# Patient Record
Sex: MALE | Race: WHITE | NOT HISPANIC OR LATINO
[De-identification: names, ages, dates, MRNs, and addresses within clinical notes are randomized per-mention and may not be internally consistent; named-entity substitution may affect disease eponyms.]

---

## 2018-10-25 ENCOUNTER — APPOINTMENT (OUTPATIENT)
Dept: GASTROENTEROLOGY | Facility: CLINIC | Age: 63
End: 2018-10-25
Payer: COMMERCIAL

## 2018-10-25 VITALS
TEMPERATURE: 98.2 F | HEIGHT: 67 IN | BODY MASS INDEX: 28.72 KG/M2 | HEART RATE: 82 BPM | DIASTOLIC BLOOD PRESSURE: 60 MMHG | OXYGEN SATURATION: 96 % | SYSTOLIC BLOOD PRESSURE: 110 MMHG | WEIGHT: 183 LBS

## 2018-10-25 DIAGNOSIS — Z12.11 ENCOUNTER FOR SCREENING FOR MALIGNANT NEOPLASM OF COLON: ICD-10-CM

## 2018-10-25 DIAGNOSIS — Z86.79 PERSONAL HISTORY OF OTHER DISEASES OF THE CIRCULATORY SYSTEM: ICD-10-CM

## 2018-10-25 DIAGNOSIS — Z87.19 PERSONAL HISTORY OF OTHER DISEASES OF THE DIGESTIVE SYSTEM: ICD-10-CM

## 2018-10-25 PROCEDURE — 99203 OFFICE O/P NEW LOW 30 MIN: CPT

## 2018-10-25 RX ORDER — LOSARTAN POTASSIUM AND HYDROCHLOROTHIAZIDE 25; 100 MG/1; MG/1
100-25 TABLET ORAL
Refills: 0 | Status: ACTIVE | COMMUNITY

## 2018-10-25 RX ORDER — OMEPRAZOLE MAGNESIUM 20 MG/1
20 CAPSULE, DELAYED RELEASE ORAL
Refills: 0 | Status: ACTIVE | COMMUNITY

## 2018-10-25 RX ORDER — TAMSULOSIN HYDROCHLORIDE 0.4 MG/1
0.4 CAPSULE ORAL
Refills: 0 | Status: ACTIVE | COMMUNITY

## 2018-12-03 ENCOUNTER — CHART COPY (OUTPATIENT)
Age: 63
End: 2018-12-03

## 2018-12-12 ENCOUNTER — APPOINTMENT (OUTPATIENT)
Dept: GASTROENTEROLOGY | Facility: AMBULATORY MEDICAL SERVICES | Age: 63
End: 2018-12-12
Payer: COMMERCIAL

## 2018-12-12 PROCEDURE — 45385 COLONOSCOPY W/LESION REMOVAL: CPT

## 2020-07-05 ENCOUNTER — TRANSCRIPTION ENCOUNTER (OUTPATIENT)
Age: 65
End: 2020-07-05

## 2020-08-24 ENCOUNTER — APPOINTMENT (OUTPATIENT)
Dept: GASTROENTEROLOGY | Facility: CLINIC | Age: 65
End: 2020-08-24
Payer: COMMERCIAL

## 2020-08-24 VITALS
WEIGHT: 175 LBS | SYSTOLIC BLOOD PRESSURE: 110 MMHG | HEIGHT: 67 IN | TEMPERATURE: 98 F | BODY MASS INDEX: 27.47 KG/M2 | DIASTOLIC BLOOD PRESSURE: 78 MMHG

## 2020-08-24 DIAGNOSIS — D72.820 LYMPHOCYTOSIS (SYMPTOMATIC): ICD-10-CM

## 2020-08-24 DIAGNOSIS — R10.32 LEFT LOWER QUADRANT PAIN: ICD-10-CM

## 2020-08-24 PROCEDURE — 99214 OFFICE O/P EST MOD 30 MIN: CPT

## 2020-08-24 RX ORDER — PSYLLIUM HUSK 6 G/6G
100 GRANULE ORAL
Refills: 0 | Status: ACTIVE | COMMUNITY

## 2020-08-24 RX ORDER — AMOXICILLIN AND CLAVULANATE POTASSIUM 875; 125 MG/1; MG/1
875-125 TABLET, COATED ORAL
Qty: 28 | Refills: 0 | Status: ACTIVE | COMMUNITY
Start: 2020-08-24 | End: 1900-01-01

## 2020-08-24 RX ORDER — SODIUM SULFATE, POTASSIUM SULFATE, MAGNESIUM SULFATE 17.5; 3.13; 1.6 G/ML; G/ML; G/ML
17.5-3.13-1.6 SOLUTION, CONCENTRATE ORAL
Qty: 1 | Refills: 0 | Status: DISCONTINUED | COMMUNITY
Start: 2018-12-03 | End: 2020-08-24

## 2020-08-25 NOTE — HISTORY OF PRESENT ILLNESS
[FreeTextEntry1] : The patient was seen in coverage for Dr. Wang.\par \par Patient has a history of adenomatous colonic polyps and that has known left colon diverticulosis.  He went to the [facility] emergency room on July 5 with abdominal pain for 1 week and bloody diarrhea.  CT scan revealed acute moderate sigmoid diverticulitis.  The patient was treated with Cipro and metronidazole for 10 days with resolution of his symptoms.  He felt well until about 5 days ago when he again developed intermittent lower abdominal pain shooting to the right side.  He denies fever.  He had a small bowel movement on Saturday and Sunday with a single drop of blood on the toilet paper.  He denies nausea, vomiting, heartburn, dysphasia.  The patient has lost 10 pounds since July 5.

## 2020-08-25 NOTE — PHYSICAL EXAM
[General Appearance - Alert] : alert [General Appearance - In No Acute Distress] : in no acute distress [Neck Appearance] : the appearance of the neck was normal [Neck Cervical Mass (___cm)] : no neck mass was observed [Jugular Venous Distention Increased] : there was no jugular-venous distention [Thyroid Diffuse Enlargement] : the thyroid was not enlarged [Thyroid Nodule] : there were no palpable thyroid nodules [Heart Sounds] : normal S1 and S2 [Auscultation Breath Sounds / Voice Sounds] : lungs were clear to auscultation bilaterally [Heart Rate And Rhythm] : heart rate was normal and rhythm regular [Heart Sounds Gallop] : no gallops [Heart Sounds Pericardial Friction Rub] : no pericardial rub [Murmurs] : no murmurs [Edema] : there was no peripheral edema [Abdomen Soft] : soft [Bowel Sounds] : normal bowel sounds [Abdomen Mass (___ Cm)] : no abdominal mass palpated [] : no hepato-splenomegaly [No CVA Tenderness] : no ~M costovertebral angle tenderness [No Spinal Tenderness] : no spinal tenderness [Affect] : the affect was normal [Oriented To Time, Place, And Person] : oriented to person, place, and time [Impaired Insight] : insight and judgment were intact [FreeTextEntry1] : LLQ tenderness without rebound

## 2020-08-25 NOTE — REASON FOR VISIT
[Acute] : an acute visit [FreeTextEntry1] : Diverticulitis, left lower quadrant pain, history of adenomatous colonic polyps

## 2020-08-25 NOTE — CONSULT LETTER
[FreeTextEntry1] : Dear Dr. Roshan Sandoval,\[facility] \[facility] I had the pleasure of seeing your patient SAMI MILES in the office today.  My office note is attached. PLEASE READ THE "ASSESSMENT" SECTION OF THE NOTE TO SEE MY IMPRESSION AND PLAN.\par \[facility] Thank you very much for allowing me to participate in the care of your patient.\[facility] \[facility] Sincerely,\[facility] \[facility] Lex Radford M.D., FAC, [facility] Director, Celiac Program at [facility]  of Medicine\[facility]ad [employer] [facility] [employer] [facility] at [facility] Practice Director,[facility] - Gastroenterology/Internal Medicine at [place]\[facility] [address]\[place], [place] [address] Tel: [phone]\[facility] Email: [email or web address] \[facility] \[facility] The attached note has been created using a voice recognition system (Dragon).  There may be some misspellings and typos.  Please call my office if you have any issues or questions.

## 2020-08-25 NOTE — ASSESSMENT
[FreeTextEntry1] : Patient status post an episode of diverticulitis in early July who now has recurrent abdominal pain with left lower quadrant tenderness consistent with recurrent diverticulitis.  He has a history of adenomatous polyps.\par \par Patient was given Augmentin 875 mg twice daily for 14 days.  He was also advised to stay on a low residue diet.\par \par Patient was advised to call if he has any worsening of his symptoms.  Otherwise he will return to see us in 3 weeks.\par \par The patient was advised that he will require a colonoscopy in about 6 weeks given the 2 episodes of diverticulitis in less than 2 months.

## 2020-08-27 ENCOUNTER — APPOINTMENT (OUTPATIENT)
Dept: GASTROENTEROLOGY | Facility: CLINIC | Age: 65
End: 2020-08-27

## 2020-09-11 ENCOUNTER — APPOINTMENT (OUTPATIENT)
Dept: GASTROENTEROLOGY | Facility: CLINIC | Age: 65
End: 2020-09-11
Payer: COMMERCIAL

## 2020-09-11 VITALS
HEART RATE: 95 BPM | BODY MASS INDEX: 27.31 KG/M2 | TEMPERATURE: 97.3 F | WEIGHT: 174 LBS | DIASTOLIC BLOOD PRESSURE: 74 MMHG | SYSTOLIC BLOOD PRESSURE: 106 MMHG | OXYGEN SATURATION: 98 % | HEIGHT: 67 IN

## 2020-09-11 DIAGNOSIS — Z11.59 ENCOUNTER FOR SCREENING FOR OTHER VIRAL DISEASES: ICD-10-CM

## 2020-09-11 DIAGNOSIS — Z01.818 ENCOUNTER FOR OTHER PREPROCEDURAL EXAMINATION: ICD-10-CM

## 2020-09-11 PROCEDURE — 99213 OFFICE O/P EST LOW 20 MIN: CPT

## 2020-09-11 RX ORDER — SODIUM SULFATE, POTASSIUM SULFATE, MAGNESIUM SULFATE 17.5; 3.13; 1.6 G/ML; G/ML; G/ML
17.5-3.13-1.6 SOLUTION, CONCENTRATE ORAL
Qty: 1 | Refills: 0 | Status: ACTIVE | COMMUNITY
Start: 2020-09-11 | End: 1900-01-01

## 2020-09-12 NOTE — CONSULT LETTER
[FreeTextEntry1] : Dear Dr. Roshan Sandoval,\[facility] \[facility] I had the pleasure of seeing your patient SAMI MILES in the office today.  My office note is attached. PLEASE READ THE "ASSESSMENT" SECTION OF THE NOTE TO SEE MY IMPRESSION AND PLAN.\par \[facility] Thank you very much for allowing me to participate in the care of your patient.\[facility] \[facility] Sincerely,\[facility] \[facility] Lex Radford M.D., FAC, [facility] Director, Celiac Program at [facility]  of Medicine\[facility] brad [facility] at [facility] Practice Director,[facility] - Gastroenterology/Internal Medicine at [place]\[facility] [address]\[place], [place] [address] Tel: [phone]\[facility] Email: [email or web address] \[facility] \[facility] The attached note has been created using a voice recognition system (Dragon).  There may be some misspellings and typos.  Please call my office if you have any issues or questions.

## 2020-09-12 NOTE — HISTORY OF PRESENT ILLNESS
[FreeTextEntry1] : The patient's completed 14 days of Augmentin 875 mg twice daily for diverticulitis on Sunday.  The pain has resolved.  The patient had cramping and loose stools while on the antibiotic but the symptoms have also resolved.  The patient is now having 2 solid bowel movements a day without melena or bright red blood per rectum.  He denies abdominal pain.  The patient has not been admitted to the hospital in the past year and denies any cardiac issues.\par \par \par Note from 8/24/2020 - The patient was seen in coverage for Dr. Wang.\par \par Patient has a history of adenomatous colonic polyps and that has known left colon diverticulosis.  He went to the [facility] emergency room on July 5 with abdominal pain for 1 week and bloody diarrhea.  CT scan revealed acute moderate sigmoid diverticulitis.  The patient was treated with Cipro and metronidazole for 10 days with resolution of his symptoms.  He felt well until about 5 days ago when he again developed intermittent lower abdominal pain shooting to the right side.  He denies fever.  He had a small bowel movement on Saturday and Sunday with a single drop of blood on the toilet paper.  He denies nausea, vomiting, heartburn, dysphasia.  The patient has lost 10 pounds since July 5.

## 2020-09-12 NOTE — ASSESSMENT
[FreeTextEntry1] : Patient recovered after a second episode of diverticulitis in 2 months.\par \par A colonoscopy will be scheduled with the end of October.  The risks, benefits, alternatives, and limitations of the procedure, including the possibility of missed lesions, were explained.\par \par Patient was advised to return to a high-fiber diet.\par \par \par Plan from 8/24/2020 - Patient status post an episode of diverticulitis in early July who now has recurrent abdominal pain with left lower quadrant tenderness consistent with recurrent diverticulitis.  He has a history of adenomatous polyps.\par \par Patient was given Augmentin 875 mg twice daily for 14 days.  He was also advised to stay on a low residue diet.\par \par Patient was advised to call if he has any worsening of his symptoms.  Otherwise he will return to see us in 3 weeks.\par \par The patient was advised that he will require a colonoscopy in about 6 weeks given the 2 episodes of diverticulitis in less than 2 months.

## 2020-09-12 NOTE — PHYSICAL EXAM
[General Appearance - Alert] : alert [General Appearance - In No Acute Distress] : in no acute distress [Neck Appearance] : the appearance of the neck was normal [Neck Cervical Mass (___cm)] : no neck mass was observed [Thyroid Diffuse Enlargement] : the thyroid was not enlarged [Jugular Venous Distention Increased] : there was no jugular-venous distention [Thyroid Nodule] : there were no palpable thyroid nodules [Heart Rate And Rhythm] : heart rate was normal and rhythm regular [Auscultation Breath Sounds / Voice Sounds] : lungs were clear to auscultation bilaterally [Heart Sounds Gallop] : no gallops [Heart Sounds] : normal S1 and S2 [Murmurs] : no murmurs [Edema] : there was no peripheral edema [Heart Sounds Pericardial Friction Rub] : no pericardial rub [Bowel Sounds] : normal bowel sounds [Abdomen Soft] : soft [] : no hepato-splenomegaly [Abdomen Mass (___ Cm)] : no abdominal mass palpated [No CVA Tenderness] : no ~M costovertebral angle tenderness [No Spinal Tenderness] : no spinal tenderness [Impaired Insight] : insight and judgment were intact [Oriented To Time, Place, And Person] : oriented to person, place, and time [Affect] : the affect was normal [FreeTextEntry1] : LLQ tenderness without rebound

## 2020-10-19 ENCOUNTER — APPOINTMENT (OUTPATIENT)
Dept: GASTROENTEROLOGY | Facility: AMBULATORY MEDICAL SERVICES | Age: 65
End: 2020-10-19
Payer: COMMERCIAL

## 2020-10-19 PROCEDURE — 45385 COLONOSCOPY W/LESION REMOVAL: CPT

## 2020-11-04 ENCOUNTER — APPOINTMENT (OUTPATIENT)
Dept: GASTROENTEROLOGY | Facility: CLINIC | Age: 65
End: 2020-11-04
Payer: COMMERCIAL

## 2020-11-04 ENCOUNTER — TRANSCRIPTION ENCOUNTER (OUTPATIENT)
Age: 65
End: 2020-11-04

## 2020-11-04 DIAGNOSIS — D12.6 BENIGN NEOPLASM OF COLON, UNSPECIFIED: ICD-10-CM

## 2020-11-04 DIAGNOSIS — K57.30 DIVERTICULOSIS OF LARGE INTESTINE W/OUT PERFORATION OR ABSCESS W/OUT BLEEDING: ICD-10-CM

## 2020-11-04 DIAGNOSIS — K64.4 RESIDUAL HEMORRHOIDAL SKIN TAGS: ICD-10-CM

## 2020-11-04 PROCEDURE — 99213 OFFICE O/P EST LOW 20 MIN: CPT | Mod: 95

## 2020-11-04 NOTE — ASSESSMENT
[FreeTextEntry1] : Patient with an adenomatous polyp.  He has diverticulosis and a history of diverticulitis.\par \par We will repeat a colonoscopy in 3 years that is October 2023.\par \par Patient was counseled regarding a high-fiber diet.  He uses psyllium at night.  He was advised to continue this.\par \par \par Plan from 9/11/2020 - Patient recovered after a second episode of diverticulitis in 2 months.\par \par A colonoscopy will be scheduled with the end of October.  The risks, benefits, alternatives, and limitations of the procedure, including the possibility of missed lesions, were explained.\par \par Patient was advised to return to a high-fiber diet.\par \par \par Plan from 8/24/2020 - Patient status post an episode of diverticulitis in early July who now has recurrent abdominal pain with left lower quadrant tenderness consistent with recurrent diverticulitis.  He has a history of adenomatous polyps.\par \par Patient was given Augmentin 875 mg twice daily for 14 days.  He was also advised to stay on a low residue diet.\par \par Patient was advised to call if he has any worsening of his symptoms.  Otherwise he will return to see us in 3 weeks.\par \par The patient was advised that he will require a colonoscopy in about 6 weeks given the 2 episodes of diverticulitis in less than 2 months.

## 2020-11-04 NOTE — HISTORY OF PRESENT ILLNESS
[Home] : at home, [unfilled] , at the time of the visit. [Medical Office: ([facility])___] : at the medical office located in  [Verbal consent obtained from patient] : the patient, [unfilled] [FreeTextEntry1] : We reviewed the patient's colonoscopy performed on October 19, 2020.  The examination was significant for removal of a tubular adenoma from the proximal transverse colon.  Additionally, the patient had moderate diverticulosis throughout the colon and also had terminal ileal diverticulosis.  Patient has small external hemorrhoids which are occasionally mildly symptomatic with spicy foods.  The patient uses psyllium at night.  He denies abdominal pain.  He has 2 solid bowel movements a day without melena or bright red blood per rectum.\par \par \par Note from 9/11/2020 - The patient's completed 14 days of Augmentin 875 mg twice daily for diverticulitis on Sunday.  The pain has resolved.  The patient had cramping and loose stools while on the antibiotic but the symptoms have also resolved.  The patient is now having 2 solid bowel movements a day without melena or bright red blood per rectum.  He denies abdominal pain.  The patient has not been admitted to the hospital in the past year and denies any cardiac issues.\par \par \par Note from 8/24/2020 - The patient was seen in coverage for Dr. Wang.\par \par Patient has a history of adenomatous colonic polyps and that has known left colon diverticulosis.  He went to the [facility] emergency room on July 5 with abdominal pain for 1 week and bloody diarrhea.  CT scan revealed acute moderate sigmoid diverticulitis.  The patient was treated with Cipro and metronidazole for 10 days with resolution of his symptoms.  He felt well until about 5 days ago when he again developed intermittent lower abdominal pain shooting to the right side.  He denies fever.  He had a small bowel movement on Saturday and Sunday with a single drop of blood on the toilet paper.  He denies nausea, vomiting, heartburn, dysphasia.  The patient has lost 10 pounds since July 5.

## 2020-11-04 NOTE — CONSULT LETTER
[FreeTextEntry1] : Dear Dr. Roshan Sandoval,\[facility] \[facility] I had the pleasure of seeing your patient SAMI MILES in the office today.  My office note is attached. PLEASE READ THE "ASSESSMENT" SECTION OF THE NOTE TO SEE MY IMPRESSION AND PLAN.\par \[facility] Thank you very much for allowing me to participate in the care of your patient.\[facility] \[facility] Sincerely,\[facility] \[facility] Lex Radford M.D., FAC, [facility] Director, Celiac Program at [facility]  of Medicine\[facility] brad [employer] [facility] at hospitals/[facility] Practice Director,[facility] - Gastroenterology/Internal Medicine at [place]\[facility] [address]\[place], [place] [address] Tel: [phone]\[facility] Email: [email or web address] \[facility] \[facility] The attached note has been created using a voice recognition system (Dragon).  There may be some misspellings and typos.  Please call my office if you have any issues or questions.

## 2020-12-16 PROBLEM — Z12.11 ENCOUNTER FOR SCREENING COLONOSCOPY: Status: RESOLVED | Noted: 2018-10-25 | Resolved: 2020-12-16

## 2022-01-25 ENCOUNTER — APPOINTMENT (OUTPATIENT)
Dept: RADIOLOGY | Facility: CLINIC | Age: 67
End: 2022-01-25
Payer: COMMERCIAL

## 2022-01-25 PROCEDURE — 71046 X-RAY EXAM CHEST 2 VIEWS: CPT

## 2022-01-28 ENCOUNTER — INPATIENT (INPATIENT)
Facility: HOSPITAL | Age: 67
LOS: 4 days | Discharge: HOME | DRG: 871 | End: 2022-02-02
Attending: FAMILY MEDICINE | Admitting: INTERNAL MEDICINE
Payer: COMMERCIAL

## 2022-01-28 ENCOUNTER — APPOINTMENT (OUTPATIENT)
Dept: CT IMAGING | Facility: CLINIC | Age: 67
End: 2022-01-28
Payer: COMMERCIAL

## 2022-01-28 VITALS
RESPIRATION RATE: 15 BRPM | SYSTOLIC BLOOD PRESSURE: 105 MMHG | OXYGEN SATURATION: 98 % | DIASTOLIC BLOOD PRESSURE: 62 MMHG | HEART RATE: 124 BPM | HEIGHT: 66 IN | WEIGHT: 167.99 LBS | TEMPERATURE: 99 F

## 2022-01-28 DIAGNOSIS — E87.8 OTHER DISORDERS OF ELECTROLYTE AND FLUID BALANCE, NOT ELSEWHERE CLASSIFIED: ICD-10-CM

## 2022-01-28 DIAGNOSIS — Z98.890 OTHER SPECIFIED POSTPROCEDURAL STATES: Chronic | ICD-10-CM

## 2022-01-28 DIAGNOSIS — C91.10 CHRONIC LYMPHOCYTIC LEUKEMIA OF B-CELL TYPE NOT HAVING ACHIEVED REMISSION: ICD-10-CM

## 2022-01-28 DIAGNOSIS — D64.9 ANEMIA, UNSPECIFIED: ICD-10-CM

## 2022-01-28 DIAGNOSIS — Z29.9 ENCOUNTER FOR PROPHYLACTIC MEASURES, UNSPECIFIED: ICD-10-CM

## 2022-01-28 DIAGNOSIS — N40.0 BENIGN PROSTATIC HYPERPLASIA WITHOUT LOWER URINARY TRACT SYMPTOMS: ICD-10-CM

## 2022-01-28 DIAGNOSIS — K75.0 ABSCESS OF LIVER: ICD-10-CM

## 2022-01-28 DIAGNOSIS — J90 PLEURAL EFFUSION, NOT ELSEWHERE CLASSIFIED: ICD-10-CM

## 2022-01-28 DIAGNOSIS — I82.890 ACUTE EMBOLISM AND THROMBOSIS OF OTHER SPECIFIED VEINS: ICD-10-CM

## 2022-01-28 DIAGNOSIS — K21.9 GASTRO-ESOPHAGEAL REFLUX DISEASE WITHOUT ESOPHAGITIS: ICD-10-CM

## 2022-01-28 DIAGNOSIS — N18.9 CHRONIC KIDNEY DISEASE, UNSPECIFIED: ICD-10-CM

## 2022-01-28 DIAGNOSIS — I10 ESSENTIAL (PRIMARY) HYPERTENSION: ICD-10-CM

## 2022-01-28 LAB
ALBUMIN SERPL ELPH-MCNC: 1.9 G/DL — LOW (ref 3.3–5)
ALP SERPL-CCNC: 207 U/L — HIGH (ref 40–120)
ALT FLD-CCNC: 28 U/L — SIGNIFICANT CHANGE UP (ref 12–78)
ANION GAP SERPL CALC-SCNC: 8 MMOL/L — SIGNIFICANT CHANGE UP (ref 5–17)
APPEARANCE UR: CLEAR — SIGNIFICANT CHANGE UP
APTT BLD: 28.2 SEC — SIGNIFICANT CHANGE UP (ref 27.5–35.5)
AST SERPL-CCNC: 24 U/L — SIGNIFICANT CHANGE UP (ref 15–37)
BASOPHILS # BLD AUTO: 0 K/UL — SIGNIFICANT CHANGE UP (ref 0–0.2)
BASOPHILS NFR BLD AUTO: 0 % — SIGNIFICANT CHANGE UP (ref 0–2)
BILIRUB SERPL-MCNC: 0.5 MG/DL — SIGNIFICANT CHANGE UP (ref 0.2–1.2)
BILIRUB UR-MCNC: NEGATIVE — SIGNIFICANT CHANGE UP
BUN SERPL-MCNC: 25 MG/DL — HIGH (ref 7–23)
CALCIUM SERPL-MCNC: 8.3 MG/DL — LOW (ref 8.5–10.1)
CHLORIDE SERPL-SCNC: 99 MMOL/L — SIGNIFICANT CHANGE UP (ref 96–108)
CO2 SERPL-SCNC: 25 MMOL/L — SIGNIFICANT CHANGE UP (ref 22–31)
COLOR SPEC: SIGNIFICANT CHANGE UP
CREAT SERPL-MCNC: 1.3 MG/DL — SIGNIFICANT CHANGE UP (ref 0.5–1.3)
DIFF PNL FLD: ABNORMAL
EOSINOPHIL # BLD AUTO: 0 K/UL — SIGNIFICANT CHANGE UP (ref 0–0.5)
EOSINOPHIL NFR BLD AUTO: 0 % — SIGNIFICANT CHANGE UP (ref 0–6)
GLUCOSE SERPL-MCNC: 105 MG/DL — HIGH (ref 70–99)
GLUCOSE UR QL: NEGATIVE — SIGNIFICANT CHANGE UP
HCT VFR BLD CALC: 28.4 % — LOW (ref 39–50)
HGB BLD-MCNC: 9.5 G/DL — LOW (ref 13–17)
INR BLD: 1.52 RATIO — HIGH (ref 0.88–1.16)
KETONES UR-MCNC: NEGATIVE — SIGNIFICANT CHANGE UP
LACTATE SERPL-SCNC: 1.7 MMOL/L — SIGNIFICANT CHANGE UP (ref 0.7–2)
LEUKOCYTE ESTERASE UR-ACNC: NEGATIVE — SIGNIFICANT CHANGE UP
LYMPHOCYTES # BLD AUTO: 24.62 K/UL — HIGH (ref 1–3.3)
LYMPHOCYTES # BLD AUTO: 55 % — HIGH (ref 13–44)
MCHC RBC-ENTMCNC: 30.2 PG — SIGNIFICANT CHANGE UP (ref 27–34)
MCHC RBC-ENTMCNC: 33.5 GM/DL — SIGNIFICANT CHANGE UP (ref 32–36)
MCV RBC AUTO: 90.2 FL — SIGNIFICANT CHANGE UP (ref 80–100)
MONOCYTES # BLD AUTO: 0.9 K/UL — SIGNIFICANT CHANGE UP (ref 0–0.9)
MONOCYTES NFR BLD AUTO: 2 % — SIGNIFICANT CHANGE UP (ref 2–14)
NEUTROPHILS # BLD AUTO: 18.8 K/UL — HIGH (ref 1.8–7.4)
NEUTROPHILS NFR BLD AUTO: 40 % — LOW (ref 43–77)
NITRITE UR-MCNC: NEGATIVE — SIGNIFICANT CHANGE UP
NRBC # BLD: SIGNIFICANT CHANGE UP /100 WBCS (ref 0–0)
PH UR: 5 — SIGNIFICANT CHANGE UP (ref 5–8)
PLATELET # BLD AUTO: 462 K/UL — HIGH (ref 150–400)
POTASSIUM SERPL-MCNC: 3.1 MMOL/L — LOW (ref 3.5–5.3)
POTASSIUM SERPL-SCNC: 3.1 MMOL/L — LOW (ref 3.5–5.3)
PROT SERPL-MCNC: 7.4 G/DL — SIGNIFICANT CHANGE UP (ref 6–8.3)
PROT UR-MCNC: 30 MG/DL
PROTHROM AB SERPL-ACNC: 17.4 SEC — HIGH (ref 10.6–13.6)
RAPID RVP RESULT: SIGNIFICANT CHANGE UP
RBC # BLD: 3.15 M/UL — LOW (ref 4.2–5.8)
RBC # FLD: 13.9 % — SIGNIFICANT CHANGE UP (ref 10.3–14.5)
SARS-COV-2 RNA SPEC QL NAA+PROBE: SIGNIFICANT CHANGE UP
SODIUM SERPL-SCNC: 132 MMOL/L — LOW (ref 135–145)
SP GR SPEC: 1 — LOW (ref 1.01–1.02)
UROBILINOGEN FLD QL: NEGATIVE — SIGNIFICANT CHANGE UP
WBC # BLD: 44.77 K/UL — CRITICAL HIGH (ref 3.8–10.5)
WBC # FLD AUTO: 44.77 K/UL — CRITICAL HIGH (ref 3.8–10.5)

## 2022-01-28 PROCEDURE — 74177 CT ABD & PELVIS W/CONTRAST: CPT

## 2022-01-28 PROCEDURE — 71260 CT THORAX DX C+: CPT

## 2022-01-28 PROCEDURE — 71045 X-RAY EXAM CHEST 1 VIEW: CPT | Mod: 26

## 2022-01-28 PROCEDURE — 93010 ELECTROCARDIOGRAM REPORT: CPT

## 2022-01-28 PROCEDURE — 74183 MRI ABD W/O CNTR FLWD CNTR: CPT | Mod: 26

## 2022-01-28 PROCEDURE — 99223 1ST HOSP IP/OBS HIGH 75: CPT | Mod: GC

## 2022-01-28 PROCEDURE — 99285 EMERGENCY DEPT VISIT HI MDM: CPT

## 2022-01-28 RX ORDER — SODIUM CHLORIDE 9 MG/ML
1000 INJECTION INTRAMUSCULAR; INTRAVENOUS; SUBCUTANEOUS
Refills: 0 | Status: DISCONTINUED | OUTPATIENT
Start: 2022-01-28 | End: 2022-01-29

## 2022-01-28 RX ORDER — TAMSULOSIN HYDROCHLORIDE 0.4 MG/1
1 CAPSULE ORAL
Qty: 0 | Refills: 0 | DISCHARGE

## 2022-01-28 RX ORDER — LOSARTAN/HYDROCHLOROTHIAZIDE 100MG-25MG
1 TABLET ORAL
Qty: 0 | Refills: 0 | DISCHARGE

## 2022-01-28 RX ORDER — HEPARIN SODIUM 5000 [USP'U]/ML
3000 INJECTION INTRAVENOUS; SUBCUTANEOUS EVERY 6 HOURS
Refills: 0 | Status: DISCONTINUED | OUTPATIENT
Start: 2022-01-28 | End: 2022-01-31

## 2022-01-28 RX ORDER — HEPARIN SODIUM 5000 [USP'U]/ML
6500 INJECTION INTRAVENOUS; SUBCUTANEOUS ONCE
Refills: 0 | Status: COMPLETED | OUTPATIENT
Start: 2022-01-28 | End: 2022-01-28

## 2022-01-28 RX ORDER — PIPERACILLIN AND TAZOBACTAM 4; .5 G/20ML; G/20ML
3.38 INJECTION, POWDER, LYOPHILIZED, FOR SOLUTION INTRAVENOUS EVERY 8 HOURS
Refills: 0 | Status: DISCONTINUED | OUTPATIENT
Start: 2022-01-28 | End: 2022-02-01

## 2022-01-28 RX ORDER — METRONIDAZOLE 500 MG
500 TABLET ORAL ONCE
Refills: 0 | Status: COMPLETED | OUTPATIENT
Start: 2022-01-28 | End: 2022-01-28

## 2022-01-28 RX ORDER — TAMSULOSIN HYDROCHLORIDE 0.4 MG/1
0.4 CAPSULE ORAL AT BEDTIME
Refills: 0 | Status: DISCONTINUED | OUTPATIENT
Start: 2022-01-28 | End: 2022-02-02

## 2022-01-28 RX ORDER — PANTOPRAZOLE SODIUM 20 MG/1
40 TABLET, DELAYED RELEASE ORAL
Refills: 0 | Status: DISCONTINUED | OUTPATIENT
Start: 2022-01-28 | End: 2022-02-02

## 2022-01-28 RX ORDER — OMEPRAZOLE 10 MG/1
1 CAPSULE, DELAYED RELEASE ORAL
Qty: 0 | Refills: 0 | DISCHARGE

## 2022-01-28 RX ORDER — HEPARIN SODIUM 5000 [USP'U]/ML
INJECTION INTRAVENOUS; SUBCUTANEOUS
Qty: 25000 | Refills: 0 | Status: DISCONTINUED | OUTPATIENT
Start: 2022-01-28 | End: 2022-01-29

## 2022-01-28 RX ORDER — ACETAMINOPHEN 500 MG
650 TABLET ORAL ONCE
Refills: 0 | Status: COMPLETED | OUTPATIENT
Start: 2022-01-28 | End: 2022-01-28

## 2022-01-28 RX ORDER — SODIUM CHLORIDE 9 MG/ML
2000 INJECTION INTRAMUSCULAR; INTRAVENOUS; SUBCUTANEOUS ONCE
Refills: 0 | Status: COMPLETED | OUTPATIENT
Start: 2022-01-28 | End: 2022-01-28

## 2022-01-28 RX ORDER — PIPERACILLIN AND TAZOBACTAM 4; .5 G/20ML; G/20ML
3.38 INJECTION, POWDER, LYOPHILIZED, FOR SOLUTION INTRAVENOUS ONCE
Refills: 0 | Status: COMPLETED | OUTPATIENT
Start: 2022-01-28 | End: 2022-01-28

## 2022-01-28 RX ORDER — HEPARIN SODIUM 5000 [USP'U]/ML
6500 INJECTION INTRAVENOUS; SUBCUTANEOUS EVERY 6 HOURS
Refills: 0 | Status: DISCONTINUED | OUTPATIENT
Start: 2022-01-28 | End: 2022-01-31

## 2022-01-28 RX ORDER — ONDANSETRON 8 MG/1
4 TABLET, FILM COATED ORAL EVERY 8 HOURS
Refills: 0 | Status: DISCONTINUED | OUTPATIENT
Start: 2022-01-28 | End: 2022-02-02

## 2022-01-28 RX ORDER — POTASSIUM CHLORIDE 20 MEQ
40 PACKET (EA) ORAL EVERY 4 HOURS
Refills: 0 | Status: COMPLETED | OUTPATIENT
Start: 2022-01-28 | End: 2022-01-28

## 2022-01-28 RX ORDER — LOSARTAN/HYDROCHLOROTHIAZIDE 100MG-25MG
0 TABLET ORAL
Qty: 0 | Refills: 0 | DISCHARGE

## 2022-01-28 RX ADMIN — SODIUM CHLORIDE 60 MILLILITER(S): 9 INJECTION INTRAMUSCULAR; INTRAVENOUS; SUBCUTANEOUS at 18:31

## 2022-01-28 RX ADMIN — SODIUM CHLORIDE 2000 MILLILITER(S): 9 INJECTION INTRAMUSCULAR; INTRAVENOUS; SUBCUTANEOUS at 17:07

## 2022-01-28 RX ADMIN — TAMSULOSIN HYDROCHLORIDE 0.4 MILLIGRAM(S): 0.4 CAPSULE ORAL at 22:49

## 2022-01-28 RX ADMIN — HEPARIN SODIUM 6500 UNIT(S): 5000 INJECTION INTRAVENOUS; SUBCUTANEOUS at 18:48

## 2022-01-28 RX ADMIN — Medication 650 MILLIGRAM(S): at 15:20

## 2022-01-28 RX ADMIN — HEPARIN SODIUM 1400 UNIT(S)/HR: 5000 INJECTION INTRAVENOUS; SUBCUTANEOUS at 18:48

## 2022-01-28 RX ADMIN — Medication 650 MILLIGRAM(S): at 14:20

## 2022-01-28 RX ADMIN — PIPERACILLIN AND TAZOBACTAM 25 GRAM(S): 4; .5 INJECTION, POWDER, LYOPHILIZED, FOR SOLUTION INTRAVENOUS at 22:49

## 2022-01-28 RX ADMIN — SODIUM CHLORIDE 2000 MILLILITER(S): 9 INJECTION INTRAMUSCULAR; INTRAVENOUS; SUBCUTANEOUS at 14:20

## 2022-01-28 RX ADMIN — Medication 500 MILLIGRAM(S): at 15:19

## 2022-01-28 RX ADMIN — Medication 40 MILLIEQUIVALENT(S): at 18:31

## 2022-01-28 RX ADMIN — PIPERACILLIN AND TAZOBACTAM 200 GRAM(S): 4; .5 INJECTION, POWDER, LYOPHILIZED, FOR SOLUTION INTRAVENOUS at 15:19

## 2022-01-28 RX ADMIN — Medication 40 MILLIEQUIVALENT(S): at 22:49

## 2022-01-28 RX ADMIN — PIPERACILLIN AND TAZOBACTAM 3.38 GRAM(S): 4; .5 INJECTION, POWDER, LYOPHILIZED, FOR SOLUTION INTRAVENOUS at 17:07

## 2022-01-28 NOTE — H&P ADULT - PROBLEM SELECTOR PLAN 2
Outpatient CT scan reports showing findings most suggestive of multiple abscesses in the right hepatic lobe with associated thrombosis of the right hepatic vein consistent with septic thrombophlebitis  -Septic workup as above  -Start Heparin gtt per GI

## 2022-01-28 NOTE — ED PROVIDER NOTE - OBJECTIVE STATEMENT
Pt is a 65 yo male with pmhx of CLL diverticulitis GERD and HTN sent in by Dr. Tubbs for liver abscess seen on out pt CT. Pt states he has been having weakness and body aches/chills over the last month. Pt states symptoms worse last few days denies any abdominal pain cough nvd leg swelling.     pcp zee Sandoval

## 2022-01-28 NOTE — ED PROVIDER NOTE - ATTENDING CONTRIBUTION TO CARE
Patient seen with ACP, substantiative portion of visit including medical decision making done by myself in coordination with ACP. In Brief: 66 M with CLL here after CT showed liver abscess and diverticulitis. patient has been having fatigue and shortness of breath. no nausea, vomiting, abdominal pain. will start antibiotics and plan for admission.

## 2022-01-28 NOTE — H&P ADULT - PROBLEM SELECTOR PLAN 1
Outpatient CT scan reports showing findings most suggestive of multiple abscesses in the right hepatic lobe with associated thrombosis of the right hepatic vein consistent with septic thrombophlebitis  -Patient meeting sepsis criteria on admission with tachycardia, temp, leukocytosis (baseline 24)  -Admit to general medical floor  -S/p IV Zosyn and PO Flagyl in ED  -Start IV Zosyn  -S/p 2L NS bolus in ED, start maintenance IVF, keep NPO  -F/u blood cultures, UA nonspecific, f/u urine cultures  -F/u MRCP per GI recs  -No acute surgical intervention at this time per Gen Surg  -May need IR drainage pending response and clinical course  -GI Dr. Acosta consulted  -ID Dr. Valdovinos consulted  -Surg Dr. Carey consulted Outpatient CT scan reports showing findings most suggestive of multiple abscesses in the right hepatic lobe with associated thrombosis of the right hepatic vein consistent with septic thrombophlebitis  -Patient meeting sepsis criteria on admission with tachycardia, temp, leukocytosis (baseline 24)  -Admit to general medical floor  -S/p IV Zosyn and PO Flagyl in ED  -Start IV Zosyn  -S/p 2L NS bolus in ED, start maintenance IVF, keep NPO  -F/u blood cultures, UA nonspecific, f/u urine cultures  -F/u MRCP per GI recs  -No acute surgical intervention at this time per Gen Surg  -May need IR drainage pending response and clinical course  -GI Dr. Acosta consulted  -ID Dr. Kayce Zhao consulted  -Surg Dr. Carey consulted

## 2022-01-28 NOTE — H&P ADULT - PROBLEM SELECTOR PLAN 4
Na 132, K 3.1 on admission  -Hyponatremia likely due to low solute intake over 1 month  -S/p 2L bolus in ED, start 60cc NS   -Potassium tab x1  -Monitor daily BMP Na 132, K 3.1 on admission  -Hyponatremia likely due to low solute intake over 1 month  -S/p 2L bolus in ED, start 60cc NS   -Potassium tab x2  -Monitor daily BMP

## 2022-01-28 NOTE — CONSULT NOTE ADULT - ASSESSMENT
67 yo male with pmhx of CLL diverticulitis GERD and HTN sent in by Dr. Tubbs for liver abscess seen on out pt CT.    intraabdominal abscess  thrombosis of the right hepatic vein consistent with septic thrombophlebitis  CLL  hx of Diverticular Disease of Intestine  Pleural eff - Atelectasis  Adrenal nodule    I darryl  DVT p = ac =   emp ABX - ID eval - cx and biomarkers -   will need IR drain and sampling of fluid - gram stain and cx  will consider - discuss with IR - Pleural eff drainage - likely reactive from the process of Abscess - Hepatic -   CLL management - Onc following  CT abd chest reviewed from outpatient  monitor VS and HD and Sat - keep sat > 88 pct  Surgery Eval noted  cvs rx regimen and BP control  Replete Lytes -     
liver abscess  diverticulitis  abdominal pain    regular diet  iv antibiotics  ID eval  f/u cultures  ? septic thrombophlebitis; start hep gtt  check MRI/MRCP with contrast  may need picc  IR eval for eval for drainage  will follow
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 Possible septic thrombophlebitis    Probable recent GIB, and blood loss.   Hgb stable.     Reactive thrombocytosis  Leukocytosis higher than usual due to infection    pnemunonia, R effusion    1cm R adrenal nodule, incidental finding.     Blood type O pos    RECOMMENDATIONS  intravenous antibiotics per infectious diseases. Started on Zosyn    ID evaluation, Dr Roxanna Brand contacted.   GI eval, [Dr Mendosa] appreciated, MRCP ordered.   Surgery Eval.   Pulm eval for effusion    Transfuse PRBC as clinically indicated.   Transfuse PRBC if Hgb <7.0 or if symptomatic.   Follow CBC    DVT Prophylaxis  start IV heparin cautiously.   If WBC sushila, or if worsening fever, then stop.   OOB as tolerated.     Case discussed with interventional radiology Dr. Yoon.   imaging from CT scan review which did not show large fluid collection but rather multiple small collections. No large fluid to drain however can perform IR guided aspiration for cultures is needed.    Tentatively will schedule for Monday per IR.  NPO past midnight on Sunday, and would hold anticoagulation for procedure.    Thank you for consulting us.     Discussed plan of care with patient and wife Smiley in detail.   They expressed understanding of the treatment plan.   Risks, benefits and alternatives discussed in detail.   Opportunity given for questions and discussion.   Any questions or concerns all addressed and answered to their satisfaction, and in lay terms.     Discussed with Dr NGHIA Cannon. Dr Hartley

## 2022-01-28 NOTE — ED ADULT TRIAGE NOTE - CHIEF COMPLAINT QUOTE
patient reports weakness and body aches/chills over the last month. hematologist sent patient for a CT abdomen, completed this morning. CT results showed an abscess on the liver. patient was told by his doctor to come to the ED today

## 2022-01-28 NOTE — H&P ADULT - PROBLEM SELECTOR PLAN 3
Outpatient CT scan showing moderate right pleural effusion with associated segmental and subsegmental consolidations in the posterior right middle lobe and right lower lobe may represent atelectasis and/or pneumonia  -Likely reactive in setting of hepatic abscess, however may need thoracentesis with IR  -Continue IV Zosyn  -F/u blood cultures  -ID Dr. Valdovinos consulted  -Pulm Dr. Hartley consulted, recs appreciated Outpatient CT scan showing moderate right pleural effusion with associated segmental and subsegmental consolidations in the posterior right middle lobe and right lower lobe may represent atelectasis and/or pneumonia  -Likely reactive in setting of hepatic abscess, however may need thoracentesis with IR and fluid analysis  -Continue IV Zosyn for possible PNA  -F/u blood cultures  -ID Dr. Valdovinos consulted  -Pulm Dr. Hartley consulted, recs appreciated Outpatient CT scan showing moderate right pleural effusion with associated segmental and subsegmental consolidations in the posterior right middle lobe and right lower lobe may represent atelectasis and/or pneumonia  -Likely reactive in setting of hepatic abscess, however may need thoracentesis with IR and fluid analysis  -Continue IV Zosyn for possible PNA  -F/u blood cultures  -ID Dr. Brand consulted  -Pulm Dr. Hartley consulted, recs appreciated

## 2022-01-28 NOTE — ED ADULT NURSE NOTE - NSICDXPASTMEDICALHX_GEN_ALL_CORE_FT
PAST MEDICAL HISTORY:  CLL (chronic lymphocytic leukemia)     Diverticulitis     Enlarged prostate     GERD (gastroesophageal reflux disease)     HTN (hypertension)

## 2022-01-28 NOTE — ED ADULT NURSE NOTE - OBJECTIVE STATEMENT
Patient A/Ox4, steady gait with a cane. Comes in sent in by PCP for abscess of liver. Patient with hx of CLL and has recently had body aches, fever and chills. Denies any pain at this time. IV, labs, EKG done. Telemetry monitor on. Call light in reach.

## 2022-01-28 NOTE — H&P ADULT - NSHPSOCIALHISTORY_GEN_ALL_CORE
Tobacco: denies  EtOH: rare, once per month   Recreational drug use: denies  Lives with: wife, son  Ambulates: with cane due to knee and exertional dyspnea  ADLs: independent  Vaccinations: 3/3 Pf[employer], +influenza

## 2022-01-28 NOTE — H&P ADULT - NSICDXPASTSURGICALHX_GEN_ALL_CORE_FT
PAST SURGICAL HISTORY:  S/P inguinal hernia repair bilat    S/P reconstruction of ligament of knee

## 2022-01-28 NOTE — ED PROVIDER NOTE - CARE PLAN
Principal Discharge DX:	Liver abscess  Secondary Diagnosis:	PNA (pneumonia)  Secondary Diagnosis:	Diverticulitis  Secondary Diagnosis:	Sepsis   1

## 2022-01-28 NOTE — H&P ADULT - ATTENDING COMMENTS
65 yo male PMH CLL (not undergoing treatment), HTN, BPH presents to ED after outpatient CT scan showed multiple hepatic abscess, also with fatigue, rigors, fevers, myalgias, poor PO intake for past 1 month, admitted with sepsis 2/2 hepatic abscesses and thrombophlebitis of right hepatic vein.    HPI as above.     T(C): 36.9 (01-28-22 @ 18:58), Max: 38.2 (01-28-22 @ 14:09)  HR: 105 (01-28-22 @ 18:58) (105 - 124)  BP: 129/81 (01-28-22 @ 18:58) (105/62 - 129/81)  RR: 18 (01-28-22 @ 18:58) (15 - 18)  SpO2: 96% (01-28-22 @ 18:58) (96% - 98%)  Wt(kg): --    Physical Exam:   GENERAL: well-groomed, well-developed, NAD  HEENT: head NC/AT; EOM intact, conjunctiva & sclera clear; hearing grossly intact, moist mucous membranes  NECK: supple, no JVD  RESPIRATORY: CTA B/L, no wheezing, rales, rhonchi or rubs  CARDIOVASCULAR: S1&S2, RRR, no murmurs or gallops  ABDOMEN: soft, non-tender, non-distended, + Bowel sounds x4 quadrants, no guarding, rebound or rigidity  MUSCULOSKELETAL:  no clubbing, cyanosis or edema of all 4 extremities  LYMPH: no cervical lymphadenopathy  VASCULAR: Radial pulses 2+ bilaterally, no varicose veins   SKIN: warm and dry, color normal  NEUROLOGIC: AA&O X3, speech fluent no sensory loss, motor Strength 5/5 in UE & LE B/L  Psych: Normal mood and affect, normal behavior    Plan:   Liver abscesses: appreciate GI and surgery Eval.   -continue Zosyn   -Dr Brand of ID consulted.   -NPO  -f/u MRCP  -may need IR drainage.   -f/u cultures. Trend wbc count and monitor for fevers.     Hepatic vein thrombosis: continue heparin gtt  -risks and benefits discussed with patient. He is agreeable to anti-coag.     remainder as above.

## 2022-01-28 NOTE — H&P ADULT - NSICDXFAMILYHX_GEN_ALL_CORE_FT
FAMILY HISTORY:  Mother  Still living? Yes, Estimated age:   Family history of breast cancer, Age at diagnosis: Age Unknown

## 2022-01-28 NOTE — H&P ADULT - PROBLEM SELECTOR PLAN 8
Takes Losartan-HCTZ 100-25mg daily at home  -Hold home meds given slightly hypotensive on admission  -Monitor routine hemodynamics

## 2022-01-28 NOTE — CONSULT NOTE ADULT - SUBJECTIVE AND OBJECTIVE BOX
[facility]  Ray Coats PA-C  [facility] [place] [address]  [phone]      Chief Complaint:  Patient is a 66y old  Male who presents with a chief complaint of     HPI:Pt is a 65 yo male with pmhx of CLL diverticulitis GERD and HTN sent in by Dr. Tubbs for liver abscess seen on out pt CT. Pt states he has been having weakness and body aches/chills over the last month. Pt states symptoms worse last few days denies any abdominal pain cough nvd leg swelling.     patient has had diverticulitis twice  began to feel ill about a month ago; denies abdominal pain      Allergies:  No Known Allergies      Medications:      PMHX/PSHX:  CLL (chronic lymphocytic leukemia)    Diverticulitis    HTN (hypertension)    Enlarged prostate    GERD (gastroesophageal reflux disease)        Family history:      Social History:     ROS:     General:  No wt loss, fevers, chills, night sweats, fatigue,   Eyes:  Good vision, no reported pain  ENT:  No sore throat, pain, runny nose, dysphagia  CV:  No pain, palpitations, hypo/hypertension  Resp:  No dyspnea, cough, tachypnea, wheezing  GI:  No pain, No nausea, No vomiting, No diarrhea, No constipation, No weight loss, No fever, No pruritis, No rectal bleeding, No tarry stools, No dysphagia,  :  No pain, bleeding, incontinence, nocturia  Muscle:  No pain, weakness  Neuro:  No weakness, tingling, memory problems  Psych:  No fatigue, insomnia, mood problems, depression  Endocrine:  No polyuria, polydipsia, cold/heat intolerance  Heme:  No petechiae, ecchymosis, easy bruisability  Skin:  No rash, tattoos, scars, edema      PHYSICAL EXAM:   Vital Signs:  Vital Signs Last 24 Hrs  T(C): 37.4 (2022 15:29), Max: 38.2 (2022 14:09)  T(F): 99.4 (2022 15:29), Max: 100.7 (2022 14:09)  HR: 115 (2022 14:09) (115 - 124)  BP: 105/62 (2022 13:13) (105/62 - 105/62)  BP(mean): --  RR: 18 (2022 14:09) (15 - 18)  SpO2: 97% (2022 14:09) (97% - 98%)  Daily Height in cm: 167.64 (2022 13:13)    Daily     GENERAL:  Appears stated age,   HEENT:  NC/AT,    CHEST:  Full & symmetric excursion,   HEART:  Regular rhythm  ABDOMEN:  Soft, non-tender, non-distended,   EXTEREMITIES:  no cyanosis,clubbing or edema  SKIN:  No rash  NEURO:  Alert,    LABS:                        9.5    44.77 )-----------( 462      ( 2022 14:12 )             28.4         132<L>  |  99  |  25<H>  ----------------------------<  105<H>  3.1<L>   |  25  |  1.30    Ca    8.3<L>      2022 14:12    TPro  7.4  /  Alb  1.9<L>  /  TBili  0.5  /  DBili  x   /  AST  24  /  ALT  28  /  AlkPhos  207<H>      LIVER FUNCTIONS - ( 2022 14:12 )  Alb: 1.9 g/dL / Pro: 7.4 g/dL / ALK PHOS: 207 U/L / ALT: 28 U/L / AST: 24 U/L / GGT: x           PT/INR - ( 2022 14:12 )   PT: 17.4 sec;   INR: 1.52 ratio         PTT - ( 2022 14:12 )  PTT:28.2 sec  Urinalysis Basic - ( 2022 14:12 )    Color: Pale Yellow / Appearance: Clear / S.005 / pH: x  Gluc: x / Ketone: Negative  / Bili: Negative / Urobili: Negative   Blood: x / Protein: 30 mg/dL / Nitrite: Negative   Leuk Esterase: Negative / RBC: 3-5 /HPF / WBC 6-10   Sq Epi: x / Non Sq Epi: Occasional / Bacteria: Negative          Imaging:

## 2022-01-28 NOTE — H&P ADULT - PROBLEM SELECTOR PLAN 7
Diagnosed in 2017, not undergoing active treatment  -Follows outpatient with H/O Dr. Tubbs  -Baseline WBC 24

## 2022-01-28 NOTE — H&P ADULT - NSHPPHYSICALEXAM_GEN_ALL_CORE
T(C): 37.4 (01-28-22 @ 15:29), Max: 38.2 (01-28-22 @ 14:09)  HR: 115 (01-28-22 @ 14:09) (115 - 124)  BP: 105/62 (01-28-22 @ 13:13) (105/62 - 105/62)  RR: 18 (01-28-22 @ 14:09) (15 - 18)  SpO2: 97% (01-28-22 @ 14:09) (97% - 98%)  Wt(kg): --    Physical Exam:   GENERAL: well-groomed, well-developed, NAD  HEENT: head NC/AT; EOM intact, PERRLA, conjunctiva & sclera clear; hearing grossly intact, moist mucous membranes  NECK: supple, no JVD  RESPIRATORY: +decreased breath sounds R base, no wheezing, rales, rhonchi or rubs  CARDIOVASCULAR: S1&S2, RRR, no murmurs or gallops  ABDOMEN: soft, non-tender, non-distended, + Bowel sounds x4 quadrants, no guarding, rebound or rigidity  MUSCULOSKELETAL:  no clubbing, cyanosis or edema of all 4 extremities  VASCULAR: Dorsalis pedis pulses 2+ bilaterally  SKIN: warm and dry, color normal  NEUROLOGIC: alert, moving all extremities spontaneously, no sensory loss, motor Strength 5/5 in UE & LE B/L  Psych: Normal mood and affect, normal behavior

## 2022-01-28 NOTE — PATIENT PROFILE ADULT - FALL HARM RISK - UNIVERSAL INTERVENTIONS
Bed in lowest position, wheels locked, appropriate side rails in place/Call bell, personal items and telephone in reach/Instruct patient to call for assistance before getting out of bed or chair/Non-slip footwear when patient is out of bed/[place] to call system/Physically safe environment - no spills, clutter or unnecessary equipment/Purposeful Proactive Rounding/Room/bathroom lighting operational, light cord in reach

## 2022-01-28 NOTE — CONSULT NOTE ADULT - SUBJECTIVE AND OBJECTIVE BOX
Patient is a 66y old  Male who presents with a chief complaint of hepatic abscess (2022 17:21)      HPI:  67 yo male PMH CLL (not undergoing treatment), HTN, BPH presents to ED after outpatient CT scan showed multiple hepatic abscess, sent by H/O Dr. Tubbs. Patient states for past 1-2 months he has had intermittent weakness, body aches, chills, fatigue, exertional dyspnea. Patient states over Yasmeen he was "bed ridden" for days due to exhaustion and rigors. Patient thought he was improving, however this week he had 2 episodes of severe rigors lasting 1.5 hours, followed by periods of severe diaphoresis. Patient regularly follows with Dr. Tubbs and last saw him on  for his symptoms,  states he was told his WBC was "double" his baseline (baseline WBC 24). Patient was sent for outpatient imaging and got a CT Scan of abd/p earlier this AM. States he then got a call from Dr. Tubbs telling him to come to ED immediately for further evaluation. Patient denies any recent travel, sick contacts, abd pain, N/V/D, CP. Patient still c/o dry cough, exertional dyspnea, exhaustion, rigors, fevers, poor PO intake, myalgias.     In ED:  Vitals: T 100.7, , /62, RR 18, SpO2 97% on RA  Labs significant for: WBC 44.77, H/H 9.5/28.4, plt 462, neutrophil 18.8, lymphocyte 24.6, Na 132, K 3.1, BUN 25, Cr 1.30, albumin 1.9, alk phos 207, GFR 58  UA: spec gravity 1.005, protein 30, trace blood, 6-10 WBC, 3-5 RBC  CT chest/abd/p: Findings most suggestive of multiple abscesses in the right hepatic lobe with associated thrombosis of the right hepatic vein consistent with septic thrombophlebitis.   -Findings likely representing acute diverticulitis of the distal descending colon.  -Moderate right pleural effusion with associated segmental and subsegmental consolidations in the posterior right middle lobe and right lower lobe may represent atelectasis and/or pneumonia.  -Indeterminate 1 cm left adrenal nodule.  EKG: sinus tachy rate 121, possible LAE  Given: 650mg PO Tylenol x1, 500mg PO Flagyl x1, IV Zosyn x1, 2L NS bolus x1 (2022 17:21)        PAST MEDICAL & SURGICAL HISTORY:  CLL (chronic lymphocytic leukemia)    Diverticulitis    HTN (hypertension)    Enlarged prostate    GERD (gastroesophageal reflux disease)    S/P inguinal hernia repair  bilat    S/P reconstruction of ligament of knee         HEALTH ISSUES - PROBLEM Dx:  Hepatic abscess    Splenic vein thrombosis    Pleural effusion, right    CLL (chronic lymphocytic leukemia)    HTN (hypertension)    Enlarged prostate    GERD (gastroesophageal reflux disease)    Need for prophylactic measure    Chronic kidney disease, unspecified CKD stage    Anemia    Electrolyte imbalance            Abscess of liver [K75.0]    CLL (chronic lymphocytic leukemia) [C91.10]    Diverticulitis [K57.92]    HTN (hypertension) [I10]    Enlarged prostate [N40.0]    GERD (gastroesophageal reflux disease) [K21.9]    S/P inguinal hernia repair [Z98.890]    S/P reconstruction of ligament of knee [Z98.890]    PNA (pneumonia) [J18.9]    Diverticulitis [K57.92]    Sepsis [A41.9]        FAMILY HISTORY:  Family history of breast cancer (Mother)          [SOCIAL HISTORY: ]     smoking:       EtOH:       illicit drugs:       occupation:       marital status:       Other:       [ALLERGIES/INTOLERANCES:]  Allergies    No Known Allergies    Intolerances          [MEDICATIONS]  MEDICATIONS  (STANDING):  heparin   Injectable 6500 Unit(s) IV Push once  heparin  Infusion.  Unit(s)/Hr (14 mL/Hr) IV Continuous <Continuous>  pantoprazole    Tablet 40 milliGRAM(s) Oral before breakfast  piperacillin/tazobactam IVPB.. 3.375 Gram(s) IV Intermittent every 8 hours  potassium chloride    Tablet ER 40 milliEquivalent(s) Oral every 4 hours  sodium chloride 0.9%. 1000 milliLiter(s) (60 mL/Hr) IV Continuous <Continuous>  tamsulosin 0.4 milliGRAM(s) Oral at bedtime    MEDICATIONS  (PRN):  heparin   Injectable 6500 Unit(s) IV Push every 6 hours PRN For aPTT less than 40  heparin   Injectable 3000 Unit(s) IV Push every 6 hours PRN For aPTT between 40 - 57  ondansetron Injectable 4 milliGRAM(s) IV Push every 8 hours PRN Nausea and/or Vomiting        [REVIEW OF SYSTEMS: ]  CONSTITUTIONAL: normal, no fever, no shakes, no chills   EYES: No eye pain, no visual disturbances, no discharge  ENMT:  no discharge  NECK: No pain, no stiffness  BREASTS: No pain, no masses, no nipple discharge  RESPIRATORY: No cough, no wheezing, no chills, no hemoptysis; No shortness of breath  CARDIOVASCULAR: No chest pain, no palpitations, no dizziness, no leg swelling  GASTROINTESTINAL: No abdominal, no epigastric pain. No nausea, no vomiting, no hematemesis; No diarrhea , no constipation. No melena, no hematochezia.  GENITOURINARY: No dysuria, no frequency, no hematuria, no incontinence  NEUROLOGICAL: No headaches, no memory loss, no loss of strength, no numbness, no tremors  SKIN: No itching, no burning, no rashes, no lesions   LYMPH NODES: No enlarged glands  ENDOCRINE: No heat or cold intolerance; No hair loss  MUSCULOSKELETAL: No joint pain or swelling; No muscle, no back, no extremity pain  PSYCHIATRIC: No depression, no anxiety, no mood swings, no difficulty sleeping  HEME/LYMPH: No easy bruising, no bleeding gums      [VITALS SIGNS 24hrs]  Vital Signs Last 24 Hrs  T(C): 37.4 (2022 15:29), Max: 38.2 (2022 14:09)  T(F): 99.4 (2022 15:29), Max: 100.7 (2022 14:09)  HR: 115 (2022 14:09) (115 - 124)  BP: 105/62 (2022 13:13) (105/62 - 105/62)  BP(mean): --  RR: 18 (2022 14:09) (15 - 18)  SpO2: 97% (2022 14:09) (97% - 98%)  Daily Height in cm: 167.64 (2022 13:13)    Daily     I&O's Summary      Last Menstrual Period      [PHYSICAL EXAM]  General: adult in NAD,  WN,  WD.  HEENT: clear oropharynx, anicteric sclera, pink conjunctivae.  Neck: supple, no masses.  CV: normal S1S2, no murmur, no rubs, no gallops.  Lungs: clear to auscultation, no wheezes, no rales, no rhonchi.  Abdomen: soft, non-tender, non-distended, no hepatosplenomegaly, normal BS, no guarding.  Ext: no clubbing, no cyanosis, no edema.  Skin: no rashes,  no petechiae, no venous stasis changes.  Neuro: alert and oriented X3, no focal motor deficits.  LN: no SC FANTASMA.      [LABS: ]                        9.5    44.77 )-----------( 462      ( 2022 14:12 )             28.4     CBC Full  -  ( 2022 14:12 )  WBC Count : 44.77 K/uL  RBC Count : 3.15 M/uL  Hemoglobin : 9.5 g/dL  Hematocrit : 28.4 %  Platelet Count - Automated : 462 K/uL  Mean Cell Volume : 90.2 fl  Mean Cell Hemoglobin : 30.2 pg  Mean Cell Hemoglobin Concentration : 33.5 gm/dL  Auto Neutrophil # : 18.80 K/uL  Auto Lymphocyte # : 24.62 K/uL  Auto Monocyte # : 0.90 K/uL  Auto Eosinophil # : 0.00 K/uL  Auto Basophil # : 0.00 K/uL  Auto Neutrophil % : 40.0 %  Auto Lymphocyte % : 55.0 %  Auto Monocyte % : 2.0 %  Auto Eosinophil % : 0.0 %  Auto Basophil % : 0.0 %        132<L>  |  99  |  25<H>  ----------------------------<  105<H>  3.1<L>   |  25  |  1.30    Ca    8.3<L>      2022 14:12    TPro  7.4  /  Alb  1.9<L>  /  TBili  0.5  /  DBili  x   /  AST  24  /  ALT  28  /  AlkPhos  207<H>      PT/INR - ( 2022 14:12 )   PT: 17.4 sec;   INR: 1.52 ratio         PTT - ( 2022 14:12 )  PTT:28.2 sec  LIVER FUNCTIONS - ( 2022 14:12 )  Alb: 1.9 g/dL / Pro: 7.4 g/dL / ALK PHOS: 207 U/L / ALT: 28 U/L / AST: 24 U/L / GGT: x               Urinalysis Basic - ( 2022 14:12 )    Color: Pale Yellow / Appearance: Clear / S.005 / pH: x  Gluc: x / Ketone: Negative  / Bili: Negative / Urobili: Negative   Blood: x / Protein: 30 mg/dL / Nitrite: Negative   Leuk Esterase: Negative / RBC: 3-5 /HPF / WBC 6-10   Sq Epi: x / Non Sq Epi: Occasional / Bacteria: Negative            CBC TREND (5 Days)  WBC Count: 44.77 K/uL ( @ 14:12)    Hemoglobin: 9.5 g/dL ( @ 14:12)    Hematocrit: 28.4 % ( @ 14:12)    Platelet Count - Automated: 462 K/uL ( @ 14:12)      Anemia Studies                      ***********************  Myeloma Studies                          [MICROBIOLOGY /  VIROLOGY:]  SARS-CoV-2: [place] (2022 14:12)          [PATHOLOGY]       **********************      [RADIOLOGY & ADDITIONAL STUDIES:]        Patient is a 66y old  Male who presents with a chief complaint of hepatic abscess (2022 17:21)    HPI:  67 yo male PMH CLL (not undergoing treatment), HTN, BPH presents to ED after outpatient CT scan showed multiple hepatic abscess, sent by H/O Dr. Tubbs. Patient states for past 1-2 months he has had intermittent weakness, body aches, chills, fatigue, exertional dyspnea. Patient states over [place] he was "bed ridden" for days due to exhaustion and rigors. Patient thought he was improving, however this week he had 2 episodes of severe rigors lasting 1.5 hours, followed by periods of severe diaphoresis. Patient regularly follows with Dr. Tubbs and last saw him on  for his symptoms,  states he was told his WBC was "double" his baseline (baseline WBC 24). Patient was sent for outpatient imaging and got a CT Scan of abd/p earlier this AM. States he then got a call from Dr. Tubbs telling him to come to ED immediately for further evaluation. Patient denies any recent travel, sick contacts, abd pain, N/V/D, CP. Patient still c/o dry cough, exertional dyspnea, exhaustion, rigors, fevers, poor PO intake, myalgias.     In ED:  Vitals: T 100.7, , /62, RR 18, SpO2 97% on RA  Labs significant for: WBC 44.77, H/H 9.5/28.4, plt 462, neutrophil 18.8, lymphocyte 24.6, Na 132, K 3.1, BUN 25, Cr 1.30, albumin 1.9, alk phos 207, GFR 58  UA: spec gravity 1.005, protein 30, trace blood, 6-10 WBC, 3-5 RBC  CT chest/abd/p: Findings most suggestive of multiple abscesses in the right hepatic lobe with associated thrombosis of the right hepatic vein consistent with septic thrombophlebitis.   -Findings likely representing acute diverticulitis of the distal descending colon.  -Moderate right pleural effusion with associated segmental and subsegmental consolidations in the posterior right middle lobe and right lower lobe may represent atelectasis and/or pneumonia.  -Indeterminate 1 cm left adrenal nodule.  EKG: sinus tachy rate 121, possible LAE  Given: 650mg PO Tylenol x1, 500mg PO Flagyl x1, IV Zosyn x1, 2L NS bolus x1 (2022 17:21)    patient well-known to me and our office. Followed and seen initially  with an MBCL with eventual evolution CLL, ZAP 70 negative, CD 38 negative, IgG VH mutated and FISH showing -del(13q). WBC over the years have risen slowly from 15 tp 20. on 2021 WBC 23.1, Hgb 14.2, HCT 41.4, . patient otherwise on observation and to six months follow-ups, has never undergone chemotherapy are required treatment for CLL.    Patient generally compliant with vaccinations.   Has had flu vaccine and has completed Covid vaccination with booster.    patient return for routine follow-up January 10, 2022.  Noted declined in Hgb an increase in WBC.  patient reported possible Covid infection in December. Son had mild vague symptoms.  Patient then had diarrhea students for 1 to 2 weeks, which since I've gotten better.  At the time of the office visit the patient reported getting better. In office Covid PCR was negative on 01/10/22    Patient was recommended returned to office in one to two week for reevaluation. However at this point patient complained of occasional chills and night sweats, but no new lymphadenopathy.  WBC 43.8, Hgb 9.3, . 22 COVID spike IgG >205, COVID nc IgG neg  CT scan CAP ordered. Performed today 22  showed  multiple hepatic abscesses and hepatic vein thrombosis, and diverticulitis. Patient referred to ER for evaluation and admission for antibiotics.      PAST MEDICAL & SURGICAL HISTORY:  CLL (chronic lymphocytic leukemia)  Diverticulitis  HTN (hypertension)  Enlarged prostate  GERD (gastroesophageal reflux disease)  S/P inguinal hernia repair, bilat  S/P reconstruction of ligament of knee       HEALTH ISSUES - PROBLEM Dx:  Hepatic abscess  Splenic vein thrombosis  Pleural effusion, right  CLL (chronic lymphocytic leukemia)  HTN (hypertension)  Enlarged prostate  GERD (gastroesophageal reflux disease)  Need for prophylactic measure  Chronic kidney disease, unspecified CKD stage  Anemia  Electrolyte imbalance    Abscess of liver [K75.0]  CLL (chronic lymphocytic leukemia) [C91.10]  Diverticulitis [K57.92]  HTN (hypertension) [I10]  Enlarged prostate [N40.0]  GERD (gastroesophageal reflux disease) [K21.9]  S/P inguinal hernia repair [Z98.890]  S/P reconstruction of ligament of knee [Z98.890]  PNA (pneumonia) [J18.9]  Diverticulitis [K57.92]  Sepsis [A41.9]      FAMILY HISTORY:  Family history of breast cancer (Mother)      [SOCIAL HISTORY: ]     smoking:  Non-smoker     EtOH:  Two drinks per week     illicit drugs:  No illicit drugs     occupation:       marital status:       Other:       [ALLERGIES/INTOLERANCES:]  Allergies      No Known Allergies  Intolerances      [MEDICATIONS]  MEDICATIONS  (STANDING):  heparin   Injectable 6500 Unit(s) IV Push once  heparin  Infusion.  Unit(s)/Hr (14 mL/Hr) IV Continuous <Continuous>  pantoprazole    Tablet 40 milliGRAM(s) Oral before breakfast  piperacillin/tazobactam IVPB.. 3.375 Gram(s) IV Intermittent every 8 hours  potassium chloride    Tablet ER 40 milliEquivalent(s) Oral every 4 hours  sodium chloride 0.9%. 1000 milliLiter(s) (60 mL/Hr) IV Continuous <Continuous>  tamsulosin 0.4 milliGRAM(s) Oral at bedtime    MEDICATIONS  (PRN):  heparin   Injectable 6500 Unit(s) IV Push every 6 hours PRN For aPTT less than 40  heparin   Injectable 3000 Unit(s) IV Push every 6 hours PRN For aPTT between 40 - 57  ondansetron Injectable 4 milliGRAM(s) IV Push every 8 hours PRN Nausea and/or Vomiting        [REVIEW OF SYSTEMS: ]  CONSTITUTIONAL: +malaize, +fever, no shakes, +chills   EYES: No eye pain, no visual disturbances, no discharge  ENMT:  no discharge  NECK: No pain, no stiffness  BREASTS: No pain, no masses, no nipple discharge  RESPIRATORY: +cough, no wheezing, no chills, no hemoptysis; No shortness of breath  CARDIOVASCULAR: No chest pain, no palpitations, no dizziness, no leg swelling  GASTROINTESTINAL: No abdominal, no epigastric pain. No nausea, no vomiting, no hematemesis; No diarrhea , no constipation. No melena, no hematochezia.  GENITOURINARY: No dysuria, no frequency, no hematuria, no incontinence  NEUROLOGICAL: No headaches, no memory loss, no loss of strength, no numbness, no tremors  SKIN: No itching, no burning, no rashes, no lesions   LYMPH NODES: No enlarged glands  ENDOCRINE: No heat or cold intolerance; No hair loss  MUSCULOSKELETAL: No joint pain or swelling; No muscle, no back, no extremity pain  PSYCHIATRIC: No depression, no anxiety, no mood swings, no difficulty sleeping  HEME/LYMPH: No easy bruising, no bleeding gums      [VITALS SIGNS 24hrs]  Vital Signs Last 24 Hrs  T(C): 37.4 (2022 15:29), Max: 38.2 (2022 14:09)  T(F): 99.4 (2022 15:29), Max: 100.7 (2022 14:09)  HR: 115 (2022 14:09) (115 - 124)  BP: 105/62 (2022 13:13) (105/62 - 105/62)  BP(mean): --  RR: 18 (2022 14:09) (15 - 18)  SpO2: 97% (2022 14:09) (97% - 98%)  Daily Height in cm: 167.64 (2022 13:13)    Daily     I&O's Summary      [PHYSICAL EXAM]  General: adult in NAD,  WN,  WD. Non-septic looking.   HEENT: clear oropharynx, anicteric sclera, pink conjunctivae.  Neck: supple, no masses.  CV: normal S1S2, no murmur, no rubs, no gallops.  Lungs: clear to auscultation except slight dec BS R base, no wheezes, no rales, no rhonchi.  Abdomen: soft, non-tender, non-distended, no hepatosplenomegaly, normal BS, no guarding.  Ext: no clubbing, no cyanosis, no edema.  Skin: no rashes,  no petechiae, no venous stasis changes.  Neuro: alert and oriented X3, no focal motor deficits.  LN: no SC FANTASMA.      [LABS: ]                        9.5    44.77 )-----------( 462      ( 2022 14:12 )             28.4     CBC Full  -  ( 2022 14:12 )  WBC Count : 44.77 K/uL  RBC Count : 3.15 M/uL  Hemoglobin : 9.5 g/dL  Hematocrit : 28.4 %  Platelet Count - Automated : 462 K/uL  Mean Cell Volume : 90.2 fl  Mean Cell Hemoglobin : 30.2 pg  Mean Cell Hemoglobin Concentration : 33.5 gm/dL  Auto Neutrophil # : 18.80 K/uL  Auto Lymphocyte # : 24.62 K/uL  Auto Monocyte # : 0.90 K/uL  Auto Eosinophil # : 0.00 K/uL  Auto Basophil # : 0.00 K/uL  Auto Neutrophil % : 40.0 %  Auto Lymphocyte % : 55.0 %  Auto Monocyte % : 2.0 %  Auto Eosinophil % : 0.0 %  Auto Basophil % : 0.0 %        132<L>  |  99  |  25<H>  ----------------------------<  105<H>  3.1<L>   |  25  |  1.30    Ca    8.3<L>      2022 14:12    TPro  7.4  /  Alb  1.9<L>  /  TBili  0.5  /  DBili  x   /  AST  24  /  ALT  28  /  AlkPhos  207<H>      PT/INR - ( 2022 14:12 )   PT: 17.4 sec;   INR: 1.52 ratio         PTT - ( 2022 14:12 )  PTT:28.2 sec  LIVER FUNCTIONS - ( 2022 14:12 )  Alb: 1.9 g/dL / Pro: 7.4 g/dL / ALK PHOS: 207 U/L / ALT: 28 U/L / AST: 24 U/L / GGT: x               Urinalysis Basic - ( 2022 14:12 )  Color: Pale Yellow / Appearance: Clear / S.005 / pH: x  Gluc: x / Ketone: Negative  / Bili: Negative / Urobili: Negative   Blood: x / Protein: 30 mg/dL / Nitrite: Negative   Leuk Esterase: Negative / RBC: 3-5 /HPF / WBC 6-10   Sq Epi: x / Non Sq Epi: Occasional / Bacteria: Negative        CBC TREND (5 Days)  WBC Count: 44.77 K/uL ( @ 14:12)  Hemoglobin: 9.5 g/dL ( @ 14:12)  Hematocrit: 28.4 % ( @ 14:12)  Platelet Count - Automated: 462 K/uL ( @ 14:12)    Anemia Studies        [MICROBIOLOGY /  VIROLOGY:]  SARS-CoV-2: [place] (2022 14:12)          [RADIOLOGY & ADDITIONAL STUDIES:]     EXAM: CT ABDOMEN AND PELVIS OC IC  EXAM: CT CHEST IC  PROCEDURE DATE: 2022  INTERPRETATION: CLINICAL INFORMATION: Chronic lymphocytic leukemia with elevated white blood cell count, anemia  COMPARISON: None.  FINDINGS:  CHEST:  ·	LUNGS AND LARGE AIRWAYS: Patent central airways. Posterior right middle lobe subsegmental consolidation may represent atelectasis and or pneumonia. Segmental and subsegmental consolidative changes with air bronchograms in the right lower lobe May be entirely secondary to compressive atelectasis however superimposed pneumonia cannot be excluded.  ·	PLEURA: Moderate right pleural effusion.  ·	VESSELS: Atherosclerotic changes of the aorta and coronary arteries.  ·	HEART: Heart size is normal. No pericardial effusion.  ·	MEDIASTINUM AND TATIANA: No lymphadenopathy.  ·	CHEST WALL AND LOWER NECK: Within normal limits.  ABDOMEN AND PELVIS:  ·	LIVER: Areas of low attenuation branching throughout the posterior right hepatic dome with communication to areas more anteriorly/superiorly in the posterior right hepatic dome with mild enhancement of the borders most suggestive of multiple hepatic abscesses. Peripheral enhancing fluid density along the posterior right hepatic capsule may represent a complex subcapsular component of the hepatic abscesses however recommend follow-up upon resolution.. Thrombosis of the right hepatic vein and its branches with associated altered perfusion of the right hepatic lobe.  ·	BILE DUCTS: Normal caliber.  ·	GALLBLADDER: Gallstones.  ·	SPLEEN: Within normal limits.  ·	PANCREAS: Within normal limits.  ·	ADRENALS: Indeterminate left adrenal nodule measuring 1 cm.  ·	KIDNEYS/URETERS: Kidneys enhance bilaterally and symmetrically without hydronephrosis. No renal mass or renal calculus. The ureters are unremarkable.  ·	BLADDER: Bladder is not well distended limiting evaluation.  ·	REPRODUCTIVE ORGANS: Subcentimeter pelvic lymph nodes. Minimal infiltration of the anterior pelvic fat.  ·	BOWEL: No bowel obstruction. Appendix is normal. Colonic diverticulosis. Infiltrative changes surrounding a diverticulum in the distal descending colon with thickening of the left adjacent peritoneal reflection consistent with acute diverticulitis.  ·	PERITONEUM: No ascites.  ·	VESSELS: Atherosclerotic changes.  ·	RETROPERITONEUM/LYMPH NODES: No lymphadenopathy.  ·	ABDOMINAL WALL: Fat-containing bilateral inguinal hernias.  ·	BONES: Degenerative changes.  IMPRESSION:  ·	Findings most suggestive of multiple abscesses in the right hepatic lobe with associated thrombosis of the right hepatic vein consistent with septic thrombophlebitis.  ·	Findings likely representing acute diverticulitis of the distal descending colon.  ·	Moderate right pleural effusion with associated segmental and subsegmental consolidations in the posterior right middle lobe and right lower lobe may represent atelectasis and/or pneumonia.  ·	Indeterminate 1 cm left adrenal nodule.  ·	Critical value: I discussed the finding of this report with Dr. Tubbs at 2022 12:11 PM. Critical value policy of the hospital was followed. Read back and confirmation of receipt of this communication was performed. This verbal communication supplements the text report of this document.  --- End of Report ---  TRICIA VALLADARES MD; Attending Radiologist  This document has been electronically signed. 2022 12:11PM        < from: MR MRCP w/wo IV Cont (22 @ 19:55) >  ACC: [id number] EXAM:  MR MRCP WAW IC                        PROCEDURE DATE:  2022    INTERPRETATION:  CLINICAL INFORMATION: Liver abscess. CLL. Leukocytosis and anemia.  COMPARISON: CT chest abdomen pelvis 2022.  CONTRAST/COMPLICATIONS:  ·	IV Contrast: Gadavist  7.5 cc administered   0 cc discarded  ·	Oral Contrast: NONE  ·	Complications: None reported at time of study completion  PROCEDURE:  ·	MRI of the abdomen was performed.  ·	MRCP was performed.  FINDINGS:  ·	LOWER CHEST:Moderate right pleural effusion and signal abnormality of the lung parenchyma, better seen and described on recent chest CT.  ·	LIVER: Multifocal peripherally enhancing/serpiginous appearing collections are identified throughout the liver particularly of segment 7, 8, and 4A/B, with edema on T2, diffusion restriction and peripheral enhancement. Findings are concerning for multifocal liver abscesses, many of which contain numerous small loculations. Dominant area of involvement of segment 7 measures approximately 5.1 x 4.7 cm, image 32 series 1104,   ·	comprised of innumerable small tiny pockets. Subcapsular dome component is noted extending from this region. Larger dominant abscess pockets are seen of segment 8 measuring 3.5 x 2.4 cm, image 22 series 1104 and of segment 4A/B measuring 2.2 x 1.5 cm, image 39 series 1104.  ·	There is additional prominent hypervascularity noted along segments 5 and 6, which may represent additional developing abscess/phlegmonous change, images 50-55 of series 1101. Vague areas of hypervascularity are noted of segment 2/3, image 38 series 2001  ·	There is associated septic thrombophlebitis of the right hepatic vein as well as excessive branch of the right middle vein. There is associated altered perfusion of the right hepatic lobe. The main portal vein, right portal vein, and left portal vein are patent.  ·	BILE DUCTS: No biliary distention.  ·	GALLBLADDER: Unremarkable.  ·	SPLEEN: Borderline enlarged. Measures 13 cm in craniocaudal dimension and 14.2 cm in anterior posterior dimension.  ·	PANCREAS: No main pancreatic ductal dilatation.  ·	ADRENALS: The previously described indeterminate 1 cm left adrenal nodule demonstrates T1 hyperintensity, T2 hypointensity, does notclearly enhance, however demonstrates diffusion restriction. There is no clear signal dropout on out-of-phase images. However, evaluation is limited evaluation due to small size and motion artifact. Correlate with adrenal protocol CT for better spatial resolution and further characterization.  ·	KIDNEYS/URETERS: No hydronephrosis.  ·	VISUALIZED PORTIONS:  ·	BOWEL: Stomach is underdistended. No bowel distention. Findings of diverticulitis seen on the recent CT are not imaged on this study.  ·	PERITONEUM: Trace perihepatic fluid.  ·	VESSELS: No aneurysm of the abdominal aorta.  ·	RETROPERITONEUM/LYMPH NODES: Small volume upper abdominal nodes.  ·	ABDOMINAL WALL: Unremarkable.  ·	BONES: Degenerative changes, suboptimally assessed on abdominal protocol MRI.  IMPRESSION:  ·	Multifocal liver abscesses of segments 7, 8 and 4A/B with associated hepatic vein septic thrombophlebitis. Additional developing abscesses/ phlegmonous change of segments 2/3, 5, and 6.  ·	Findings of diverticulitis seen on the recent CT are not imaged on this study.  ·	Moderate right pleural effusion.  ·	Indeterminate 1 cm left adrenal nodule, is suboptimally assessed due to small size and motion artifact. Correlate with adrenal protocol abdominal CT.  --- End of Report ---  MYCHAL HERNÁNDEZ M.D., ATTENDING RADIOLOGIST  This document has been electronically signed. 2022  8:48PM  < end of copied text >

## 2022-01-28 NOTE — H&P ADULT - ASSESSMENT
65 yo male PMH CLL (not undergoing treatment), HTN, BPH presents to ED after outpatient CT scan showed multiple hepatic abscess, also with fatigue, rigors, fevers, myalgias, poor PO intake for past 1 month, admitted with sepsis 2/2 hepatic abscesses and thrombophlebitis of right hepatic vein.

## 2022-01-28 NOTE — CONSULT NOTE ADULT - SUBJECTIVE AND OBJECTIVE BOX
66M PMH CLL, diverticulosis with diverticulitis episodes in the past (last one 2 years ago treated with PO abx), GERD, BPH, and HTN. He was seen this morning by Dr. Tubbs for outpatient CT scan which showed liver abscess and diverticulitis. Pt states he has been having weakness, bodyaches, chills since Wednesday 1/26/2022. Felt "feverish" but did not take his temperature. States he also has had a cough for the last week. Dry cough without sputum production.     Last colonoscopy 18 months ago- states it was "normal" with diverticulosis     PMH: CLL, diverticulosis with diverticulitis episodes in the past (last one 2 years ago treated with PO abx), GERD, BPH, and HTN.    PSH: bilateral inguinal hernia repair  SOCIAL: denies tobacco usage. Social alcohol usage has not drank any in 1 month  ALLERGIES: NKDA     ROS as below     VITALS  T(C): 37.4 (01-28-22 @ 15:29), Max: 38.2 (01-28-22 @ 14:09)  HR: 115 (01-28-22 @ 14:09) (115 - 124)  BP: 105/62 (01-28-22 @ 13:13) (105/62 - 105/62)  RR: 18 (01-28-22 @ 14:09) (15 - 18)  SpO2: 97% (01-28-22 @ 14:09) (97% - 98%)      PE  General: Pt is lying in bed, NAD, A+O x 3  Cardio: Mildly tachycardic   Lungs: NLB  Abdomen: Belly is soft, NON distended and NON tender to palpation  Ext: Calves are soft and non tender to palpation b/l.  ( - ) edema      ASSESSMENT  66M with liver abscess, right pleural effusion with ?RLL PNA, and acute diverticulitis seen on CT scan,   - CLL, HTN, diverticulosis       PLAN  No acute surgical intervention at this time  ID consulted for abx management- Zosyn for now   NPO, IVF   SCDs   Pain meds PRN  Antiemetics PRN   Will follow

## 2022-01-28 NOTE — H&P ADULT - PROBLEM SELECTOR PLAN 5
H/H 9.5/28.4on admission, unknown baseline  -Monitor closely for signs of bleeding given patient to be started on heparin gtt  -Monitor daily CBC H/H 9.5/28.4on admission, unknown baseline  -Monitor closely for signs of bleeding given patient to be started on heparin gtt  -F/u iron studies  -Monitor daily CBC  -Follows with heme Dr. Tubbs outpatient

## 2022-01-28 NOTE — H&P ADULT - NSHPREVIEWOFSYSTEMS_GEN_ALL_CORE
CONSTITUTIONAL: +weakness, +fatigue, +fevers, +rigors  HEENT:  No headache, no visual changes, no sore throat  RESPIRATORY: +dry cough, +OROZCO, +SOB, no wheezing, hemoptysis  CARDIOVASCULAR: No chest pain or palpitations  GASTROINTESTINAL: No abdominal pain, no nausea, vomiting, or hematemesis; No diarrhea or constipation. No melena or hematochezia.  GENITOURINARY: No dysuria, frequency or hematuria  NEUROLOGICAL: No focal weakness or dizziness   MSK: +myalgias  SKIN: No itching, burning, rashes, or lesions

## 2022-01-28 NOTE — CONSULT NOTE ADULT - SUBJECTIVE AND OBJECTIVE BOX
Date/Time Patient Seen:  		  Referring MD:   Data Reviewed	       Patient is a 66y old  Male who presents with a chief complaint of     Subjective/HPI  vs noted  labs reviewed  ER provider note reviewed  non smoker  non drinker  known CLL  follows with Dr Tubbs  works from home -     now with sob torre weakness and chills and sweats     · HPI Objective Statement: Pt is a 67 yo male with pmhx of CLL diverticulitis GERD and HTN sent in by Dr. Tubbs for liver abscess seen on out pt CT. Pt states he has been having weakness and body aches/chills over the last month. Pt states symptoms worse last few days denies any abdominal pain cough nvd leg swelling.     	pcp zee Sandoval  · Presenting Symptoms: FEVER, WEAKNESS, chills  · Negative Findings: no decreased eating/drinking  · Timing: gradual onset  · Duration: month(s)  · Severity: MODERATE    PAST MEDICAL & SURGICAL HISTORY:  CLL (chronic lymphocytic leukemia)    Diverticulitis    HTN (hypertension)    Enlarged prostate    GERD (gastroesophageal reflux disease)          Medication list         MEDICATIONS  (STANDING):    MEDICATIONS  (PRN):         Vitals log        ICU Vital Signs Last 24 Hrs  T(C): 37.4 (28 Jan 2022 15:29), Max: 38.2 (28 Jan 2022 14:09)  T(F): 99.4 (28 Jan 2022 15:29), Max: 100.7 (28 Jan 2022 14:09)  HR: 115 (28 Jan 2022 14:09) (115 - 124)  BP: 105/62 (28 Jan 2022 13:13) (105/62 - 105/62)  BP(mean): --  ABP: --  ABP(mean): --  RR: 18 (28 Jan 2022 14:09) (15 - 18)  SpO2: 97% (28 Jan 2022 14:09) (97% - 98%)           Input and Output:  I&O's Detail      Lab Data                        9.5    44.77 )-----------( 462      ( 28 Jan 2022 14:12 )             28.4     01-28    132<L>  |  99  |  25<H>  ----------------------------<  105<H>  3.1<L>   |  25  |  1.30    Ca    8.3<L>      28 Jan 2022 14:12    TPro  7.4  /  Alb  1.9<L>  /  TBili  0.5  /  DBili  x   /  AST  24  /  ALT  28  /  AlkPhos  207<H>  01-28      family hx - GERD  ROS - weakness  allergies reviewed    lives at home  works in [employer]        Review of Systems	  weakness  sweat  chills      Objective     Physical Examination    heart s1s2  lung dec BS  abd soft  head nc  verbal  alert      Pertinent Lab findings & Imaging      Owusu:  NO   Adequate UO     I&O's Detail           Discussed with:     Cultures:	        Radiology      IMPRESSION:  Findings most suggestive of multiple abscesses in the right hepatic lobe with associated thrombosis of the right hepatic vein consistent with septic thrombophlebitis.    Findings likely representing acute diverticulitis of the distal descending colon.    Moderate right pleural effusion with associated segmental and subsegmental consolidations in the posterior right middle lobe and right lower lobe may represent atelectasis and/or pneumonia.    Indeterminate 1 cm left adrenal nodule.    Critical value: I discussed the finding of this report with Dr. Tubbs at 1/28/2022 12:11 PM. Critical value policy of the hospital was followed. Read back and confirmation of receipt of this communication was performed. This verbal communication supplements the text report of this document.    --- End of Report ---              TRICIA VALLADARES MD; Attending Radiologist  This document has been electronically signed. Jan 28 2022 12:11PM

## 2022-01-29 DIAGNOSIS — K57.92 DIVERTICULITIS OF INTESTINE, PART UNSPECIFIED, WITHOUT PERFORATION OR ABSCESS WITHOUT BLEEDING: ICD-10-CM

## 2022-01-29 DIAGNOSIS — E27.8 OTHER SPECIFIED DISORDERS OF ADRENAL GLAND: ICD-10-CM

## 2022-01-29 LAB
ALBUMIN SERPL ELPH-MCNC: 1.6 G/DL — LOW (ref 3.3–5)
ALP SERPL-CCNC: 158 U/L — HIGH (ref 40–120)
ALT FLD-CCNC: 20 U/L — SIGNIFICANT CHANGE UP (ref 12–78)
ANION GAP SERPL CALC-SCNC: 5 MMOL/L — SIGNIFICANT CHANGE UP (ref 5–17)
APTT BLD: 41.6 SEC — HIGH (ref 27.5–35.5)
APTT BLD: 51.2 SEC — HIGH (ref 27.5–35.5)
APTT BLD: 59.8 SEC — HIGH (ref 27.5–35.5)
APTT BLD: 65.4 SEC — HIGH (ref 27.5–35.5)
AST SERPL-CCNC: 15 U/L — SIGNIFICANT CHANGE UP (ref 15–37)
BASOPHILS # BLD AUTO: 0.07 K/UL — SIGNIFICANT CHANGE UP (ref 0–0.2)
BASOPHILS NFR BLD AUTO: 0.2 % — SIGNIFICANT CHANGE UP (ref 0–2)
BILIRUB SERPL-MCNC: 0.5 MG/DL — SIGNIFICANT CHANGE UP (ref 0.2–1.2)
BUN SERPL-MCNC: 18 MG/DL — SIGNIFICANT CHANGE UP (ref 7–23)
CALCIUM SERPL-MCNC: 7.8 MG/DL — LOW (ref 8.5–10.1)
CHLORIDE SERPL-SCNC: 106 MMOL/L — SIGNIFICANT CHANGE UP (ref 96–108)
CO2 SERPL-SCNC: 25 MMOL/L — SIGNIFICANT CHANGE UP (ref 22–31)
CREAT SERPL-MCNC: 1.2 MG/DL — SIGNIFICANT CHANGE UP (ref 0.5–1.3)
CULTURE RESULTS: SIGNIFICANT CHANGE UP
EOSINOPHIL # BLD AUTO: 0.01 K/UL — SIGNIFICANT CHANGE UP (ref 0–0.5)
EOSINOPHIL NFR BLD AUTO: 0 % — SIGNIFICANT CHANGE UP (ref 0–6)
FERRITIN SERPL-MCNC: 911 NG/ML — HIGH (ref 30–400)
GLUCOSE SERPL-MCNC: 98 MG/DL — SIGNIFICANT CHANGE UP (ref 70–99)
HCT VFR BLD CALC: 24.7 % — LOW (ref 39–50)
HCT VFR BLD CALC: 25.8 % — LOW (ref 39–50)
HCT VFR BLD CALC: 25.8 % — LOW (ref 39–50)
HCV AB S/CO SERPL IA: 0.13 S/CO — SIGNIFICANT CHANGE UP (ref 0–0.99)
HCV AB SERPL-IMP: SIGNIFICANT CHANGE UP
HGB BLD-MCNC: 8.3 G/DL — LOW (ref 13–17)
HGB BLD-MCNC: 8.6 G/DL — LOW (ref 13–17)
HGB BLD-MCNC: 8.7 G/DL — LOW (ref 13–17)
IMM GRANULOCYTES NFR BLD AUTO: 1.2 % — SIGNIFICANT CHANGE UP (ref 0–1.5)
IRON SATN MFR SERPL: 24 % — SIGNIFICANT CHANGE UP (ref 16–55)
IRON SATN MFR SERPL: 41 UG/DL — LOW (ref 45–165)
LYMPHOCYTES # BLD AUTO: 19.98 K/UL — HIGH (ref 1–3.3)
LYMPHOCYTES # BLD AUTO: 57.3 % — HIGH (ref 13–44)
MAGNESIUM SERPL-MCNC: 2 MG/DL — SIGNIFICANT CHANGE UP (ref 1.6–2.6)
MCHC RBC-ENTMCNC: 30.1 PG — SIGNIFICANT CHANGE UP (ref 27–34)
MCHC RBC-ENTMCNC: 30.3 PG — SIGNIFICANT CHANGE UP (ref 27–34)
MCHC RBC-ENTMCNC: 30.3 PG — SIGNIFICANT CHANGE UP (ref 27–34)
MCHC RBC-ENTMCNC: 33.3 GM/DL — SIGNIFICANT CHANGE UP (ref 32–36)
MCHC RBC-ENTMCNC: 33.6 GM/DL — SIGNIFICANT CHANGE UP (ref 32–36)
MCHC RBC-ENTMCNC: 33.7 GM/DL — SIGNIFICANT CHANGE UP (ref 32–36)
MCV RBC AUTO: 89.3 FL — SIGNIFICANT CHANGE UP (ref 80–100)
MCV RBC AUTO: 90.1 FL — SIGNIFICANT CHANGE UP (ref 80–100)
MCV RBC AUTO: 90.8 FL — SIGNIFICANT CHANGE UP (ref 80–100)
MONOCYTES # BLD AUTO: 0.82 K/UL — SIGNIFICANT CHANGE UP (ref 0–0.9)
MONOCYTES NFR BLD AUTO: 2.4 % — SIGNIFICANT CHANGE UP (ref 2–14)
NEUTROPHILS # BLD AUTO: 13.57 K/UL — HIGH (ref 1.8–7.4)
NEUTROPHILS NFR BLD AUTO: 38.9 % — LOW (ref 43–77)
NRBC # BLD: 0 /100 WBCS — SIGNIFICANT CHANGE UP (ref 0–0)
PHOSPHATE SERPL-MCNC: 3.1 MG/DL — SIGNIFICANT CHANGE UP (ref 2.5–4.5)
PLATELET # BLD AUTO: 417 K/UL — HIGH (ref 150–400)
PLATELET # BLD AUTO: 418 K/UL — HIGH (ref 150–400)
PLATELET # BLD AUTO: 435 K/UL — HIGH (ref 150–400)
POTASSIUM SERPL-MCNC: 3.6 MMOL/L — SIGNIFICANT CHANGE UP (ref 3.5–5.3)
POTASSIUM SERPL-SCNC: 3.6 MMOL/L — SIGNIFICANT CHANGE UP (ref 3.5–5.3)
PROT SERPL-MCNC: 6.3 G/DL — SIGNIFICANT CHANGE UP (ref 6–8.3)
RBC # BLD: 2.74 M/UL — LOW (ref 4.2–5.8)
RBC # BLD: 2.84 M/UL — LOW (ref 4.2–5.8)
RBC # BLD: 2.89 M/UL — LOW (ref 4.2–5.8)
RBC # FLD: 13.7 % — SIGNIFICANT CHANGE UP (ref 10.3–14.5)
RBC # FLD: 13.7 % — SIGNIFICANT CHANGE UP (ref 10.3–14.5)
RBC # FLD: 13.9 % — SIGNIFICANT CHANGE UP (ref 10.3–14.5)
SODIUM SERPL-SCNC: 136 MMOL/L — SIGNIFICANT CHANGE UP (ref 135–145)
SPECIMEN SOURCE: SIGNIFICANT CHANGE UP
TIBC SERPL-MCNC: 170 UG/DL — LOW (ref 220–430)
TRANSFERRIN SERPL-MCNC: 92 MG/DL — LOW (ref 200–360)
UIBC SERPL-MCNC: 129 UG/DL — SIGNIFICANT CHANGE UP (ref 110–370)
WBC # BLD: 34.86 K/UL — HIGH (ref 3.8–10.5)
WBC # BLD: 37.21 K/UL — HIGH (ref 3.8–10.5)
WBC # BLD: 37.35 K/UL — HIGH (ref 3.8–10.5)
WBC # FLD AUTO: 34.86 K/UL — HIGH (ref 3.8–10.5)
WBC # FLD AUTO: 37.21 K/UL — HIGH (ref 3.8–10.5)
WBC # FLD AUTO: 37.35 K/UL — HIGH (ref 3.8–10.5)

## 2022-01-29 PROCEDURE — 99233 SBSQ HOSP IP/OBS HIGH 50: CPT

## 2022-01-29 RX ORDER — METRONIDAZOLE 500 MG
500 TABLET ORAL EVERY 8 HOURS
Refills: 0 | Status: DISCONTINUED | OUTPATIENT
Start: 2022-01-29 | End: 2022-01-29

## 2022-01-29 RX ORDER — HEPARIN SODIUM 5000 [USP'U]/ML
1600 INJECTION INTRAVENOUS; SUBCUTANEOUS
Qty: 25000 | Refills: 0 | Status: DISCONTINUED | OUTPATIENT
Start: 2022-01-29 | End: 2022-01-31

## 2022-01-29 RX ORDER — METRONIDAZOLE 500 MG
500 TABLET ORAL EVERY 8 HOURS
Refills: 0 | Status: DISCONTINUED | OUTPATIENT
Start: 2022-01-29 | End: 2022-01-30

## 2022-01-29 RX ORDER — LACTOBACILLUS ACIDOPHILUS 100MM CELL
1 CAPSULE ORAL
Refills: 0 | Status: DISCONTINUED | OUTPATIENT
Start: 2022-01-29 | End: 2022-02-02

## 2022-01-29 RX ADMIN — PIPERACILLIN AND TAZOBACTAM 25 GRAM(S): 4; .5 INJECTION, POWDER, LYOPHILIZED, FOR SOLUTION INTRAVENOUS at 06:01

## 2022-01-29 RX ADMIN — Medication 1 TABLET(S): at 22:10

## 2022-01-29 RX ADMIN — Medication 100 MILLIGRAM(S): at 14:01

## 2022-01-29 RX ADMIN — PIPERACILLIN AND TAZOBACTAM 25 GRAM(S): 4; .5 INJECTION, POWDER, LYOPHILIZED, FOR SOLUTION INTRAVENOUS at 22:09

## 2022-01-29 RX ADMIN — HEPARIN SODIUM 3000 UNIT(S): 5000 INJECTION INTRAVENOUS; SUBCUTANEOUS at 15:41

## 2022-01-29 RX ADMIN — HEPARIN SODIUM 1600 UNIT(S)/HR: 5000 INJECTION INTRAVENOUS; SUBCUTANEOUS at 08:58

## 2022-01-29 RX ADMIN — PIPERACILLIN AND TAZOBACTAM 25 GRAM(S): 4; .5 INJECTION, POWDER, LYOPHILIZED, FOR SOLUTION INTRAVENOUS at 15:38

## 2022-01-29 RX ADMIN — TAMSULOSIN HYDROCHLORIDE 0.4 MILLIGRAM(S): 0.4 CAPSULE ORAL at 22:10

## 2022-01-29 RX ADMIN — HEPARIN SODIUM 1800 UNIT(S)/HR: 5000 INJECTION INTRAVENOUS; SUBCUTANEOUS at 15:38

## 2022-01-29 RX ADMIN — HEPARIN SODIUM 1400 UNIT(S)/HR: 5000 INJECTION INTRAVENOUS; SUBCUTANEOUS at 01:10

## 2022-01-29 RX ADMIN — PANTOPRAZOLE SODIUM 40 MILLIGRAM(S): 20 TABLET, DELAYED RELEASE ORAL at 06:01

## 2022-01-29 RX ADMIN — HEPARIN SODIUM 1800 UNIT(S)/HR: 5000 INJECTION INTRAVENOUS; SUBCUTANEOUS at 19:28

## 2022-01-29 RX ADMIN — HEPARIN SODIUM 1800 UNIT(S)/HR: 5000 INJECTION INTRAVENOUS; SUBCUTANEOUS at 23:26

## 2022-01-29 RX ADMIN — Medication 500 MILLIGRAM(S): at 22:10

## 2022-01-29 RX ADMIN — SODIUM CHLORIDE 60 MILLILITER(S): 9 INJECTION INTRAMUSCULAR; INTRAVENOUS; SUBCUTANEOUS at 09:02

## 2022-01-29 RX ADMIN — HEPARIN SODIUM 3000 UNIT(S): 5000 INJECTION INTRAVENOUS; SUBCUTANEOUS at 01:09

## 2022-01-29 NOTE — PROGRESS NOTE ADULT - PROBLEM SELECTOR PLAN 9
Continue home tamsulosin qhs Takes Losartan-HCTZ 100-25mg daily at home  -Hold home meds given slightly hypotensive on admission  -Monitor routine hemodynamics

## 2022-01-29 NOTE — PROGRESS NOTE ADULT - SUBJECTIVE AND OBJECTIVE BOX
Patient is a 66y old  Male who presents with a chief complaint of hepatic abscess (29 Jan 2022 05:05)      INTERVAL HPI/OVERNIGHT EVENTS: Patient seen and examined at bedside. C/o generalized weakness. Denies chest pain, sob. Had diarrhea last night. Denies nausea, vomiting. Wants to eat     MEDICATIONS  (STANDING):  heparin  Infusion. 1600 Unit(s)/Hr (16 mL/Hr) IV Continuous <Continuous>  metroNIDAZOLE  IVPB 500 milliGRAM(s) IV Intermittent every 8 hours  pantoprazole    Tablet 40 milliGRAM(s) Oral before breakfast  piperacillin/tazobactam IVPB.. 3.375 Gram(s) IV Intermittent every 8 hours  tamsulosin 0.4 milliGRAM(s) Oral at bedtime    MEDICATIONS  (PRN):  heparin   Injectable 6500 Unit(s) IV Push every 6 hours PRN For aPTT less than 40  heparin   Injectable 3000 Unit(s) IV Push every 6 hours PRN For aPTT between 40 - 57  ondansetron Injectable 4 milliGRAM(s) IV Push every 8 hours PRN Nausea and/or Vomiting      Allergies    No Known Allergies    Intolerances        REVIEW OF SYSTEMS:  CONSTITUTIONAL: No fever, + Gen weakness   EYES: No eye pain, visual disturbances, or discharge  ENMT:  No difficulty hearing, tinnitus, vertigo; No sinus or throat pain  NECK: No pain or stiffness  RESPIRATORY: No cough, wheezing, chills or hemoptysis; No shortness of breath  CARDIOVASCULAR: No chest pain, palpitations, dizziness, or leg swelling  GASTROINTESTINAL: No abdominal or epigastric pain. No nausea, vomiting, or hematemesis; No diarrhea or constipation.   GENITOURINARY: No dysuria, frequency, hematuria, or incontinence  NEUROLOGICAL: No headaches, memory loss, loss of strength, numbness, or tremors  SKIN: No itching, burning, rashes, or lesions   LYMPH NODES: No enlarged glands  MUSCULOSKELETAL: No joint pain or swelling; No muscle, back, or extremity pain  HEME/LYMPH: No easy bruising, or bleeding gums  ALLERGY AND IMMUNOLOGIC: No hives or eczema    Vital Signs Last 24 Hrs  T(C): 36.8 (29 Jan 2022 05:47), Max: 38.2 (28 Jan 2022 14:09)  T(F): 98.3 (29 Jan 2022 05:47), Max: 100.7 (28 Jan 2022 14:09)  HR: 105 (29 Jan 2022 05:47) (100 - 124)  BP: 101/56 (29 Jan 2022 05:47) (101/56 - 143/79)  BP(mean): --  RR: 18 (29 Jan 2022 05:47) (15 - 18)  SpO2: 98% (29 Jan 2022 05:47) (96% - 98%)    PHYSICAL EXAM:  GENERAL: well-groomed, well-developed, NAD  HEENT: head NC/AT; EOM intact, PERRLA, conjunctiva & sclera clear; hearing grossly intact, moist mucous membranes  NECK: supple, no JVD  RESPIRATORY: +decreased breath sounds R base, no wheezing, rales, rhonchi or rubs  CARDIOVASCULAR: S1&S2, RRR, no murmurs or gallops  ABDOMEN: soft, non-tender, non-distended, + Bowel sounds x4 quadrants, no guarding, rebound or rigidity  MUSCULOSKELETAL:  no clubbing, cyanosis or edema of all 4 extremities  VASCULAR: Dorsalis pedis pulses 2+ bilaterally  SKIN: warm and dry, color normal  NEUROLOGIC: alert, moving all extremities spontaneously, no sensory loss, motor Strength 5/5 in UE & LE B/L  Psych: Normal mood and affect   LABS:                        8.3    34.86 )-----------( 417      ( 29 Jan 2022 08:08 )             24.7     29 Jan 2022 08:08    136    |  106    |  18     ----------------------------<  98     3.6     |  25     |  1.20     Ca    7.8        29 Jan 2022 08:08    TPro  6.3    /  Alb  1.6    /  TBili  0.5    /  DBili  x      /  AST  15     /  ALT  20     /  AlkPhos  158    29 Jan 2022 08:08    PT/INR - ( 28 Jan 2022 14:12 )   PT: 17.4 sec;   INR: 1.52 ratio         PTT - ( 29 Jan 2022 08:08 )  PTT:65.4 sec  CAPILLARY BLOOD GLUCOSE        BLOOD CULTURE    RADIOLOGY & ADDITIONAL TESTS:    Imaging Personally Reviewed:  [ ] YES     Consultant(s) Notes Reviewed:      Care Discussed with Consultants/Other Providers:

## 2022-01-29 NOTE — PROGRESS NOTE ADULT - PROBLEM SELECTOR PLAN 11
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

## 2022-01-29 NOTE — PROGRESS NOTE ADULT - PROBLEM SELECTOR PLAN 5
H/H 9.5/28.4on admission, unknown baseline  -Monitor closely for signs of bleeding given patient to be started on heparin gtt  -F/u iron studies  -Monitor daily CBC  -Follows with heme Dr. Tubbs outpatient

## 2022-01-29 NOTE — PROGRESS NOTE ADULT - PROBLEM SELECTOR PLAN 8
Takes Losartan-HCTZ 100-25mg daily at home  -Hold home meds given slightly hypotensive on admission  -Monitor routine hemodynamics Diagnosed in 2017, not undergoing active treatment  -Follows outpatient with H/O Dr. Tubbs  -Baseline WBC 24

## 2022-01-29 NOTE — PROGRESS NOTE ADULT - PROBLEM SELECTOR PLAN 1
Outpatient CT scan reports showing findings most suggestive of multiple abscesses in the right hepatic lobe with associated thrombosis of the right hepatic vein consistent with septic thrombophlebitis  -Patient meeting sepsis criteria on admission with tachycardia, temp, leukocytosis (baseline 24)  -Admit to general medical floor  -S/p IV Zosyn and PO Flagyl in ED  -Continue IV Zosyn, add flagyl   - GI ok to start diet. d/c fluids   -F/u blood cultures, UA nonspecific, f/u urine cultures  -F/u MRCP per GI recs, will happen on Monday   -No acute surgical intervention at this time per Gen Surg  -May need IR drainage pending response and clinical course  -GI Dr. Acosta consulted  -ID Dr. Kayce Zhao  -Surg Dr. Carey Outpatient CT scan reports showing findings most suggestive of multiple abscesses in the right hepatic lobe with associated thrombosis of the right hepatic vein consistent with septic thrombophlebitis  -Patient meeting sepsis criteria on admission with tachycardia, temp, leukocytosis (baseline 24)  -Admit to general medical floor  -S/p IV Zosyn and PO Flagyl in ED  -Continue IV Zosyn, add flagyl   - GI ok to start diet. d/c fluids   -F/u blood cultures, UA nonspecific, f/u urine cultures  - MRCP results noted   -No acute surgical intervention at this time per Gen Surg  -May need IR drainage pending response and clinical course  -GI Dr. Acosta consulted  -ID Dr. Kayce Zhao  -Surg Dr. Carey

## 2022-01-29 NOTE — PROGRESS NOTE ADULT - ASSESSMENT
67 yo male with pmhx of CLL diverticulitis GERD and HTN sent in by Dr. Tubbs for liver abscess seen on out pt CT.    intraabdominal abscess  thrombosis of the right hepatic vein consistent with septic thrombophlebitis  CLL  hx of Diverticular Disease of Intestine  Pleural eff - Atelectasis  Adrenal nodule    MRI noted - confirmed CT findings - on ABX - on Hep Ac -   HEME onc eval noted  on RA  low grade temp    I darryl  DVT p = ac = for thrombosis  emp ABX - ID eval - cx and biomarkers -   will need IR drain and sampling of fluid - gram stain and cx  will consider - discuss with IR - Pleural eff drainage - likely reactive from the process of Abscess - Hepatic -   CLL management - Onc following  CT abd chest reviewed from outpatient  monitor VS and HD and Sat - keep sat > 88 pct  Surgery Eval noted  cvs rx regimen and BP control  Replete Lytes -

## 2022-01-29 NOTE — PROGRESS NOTE ADULT - ASSESSMENT
liver abscess  diverticulitis  abdominal pain    regular diet  iv antibiotics  ID eval  f/u cultures  can send GI pcr if diarrhea continues   ? septic thrombophlebitis; start hep gtt  check MRI/MRCP with contrast  may need picc  IR eval for eval for drainage  will follow

## 2022-01-29 NOTE — PROGRESS NOTE ADULT - SUBJECTIVE AND OBJECTIVE BOX
All interim records and events noted.    Patient is a 66y old  Male who presents with a chief complaint of hepatic abscess (29 Jan 2022 10:13)      MEDICATIONS  (STANDING):  heparin  Infusion. 1600 Unit(s)/Hr (16 mL/Hr) IV Continuous <Continuous>  metroNIDAZOLE    Tablet 500 milliGRAM(s) Oral every 8 hours  pantoprazole    Tablet 40 milliGRAM(s) Oral before breakfast  piperacillin/tazobactam IVPB.. 3.375 Gram(s) IV Intermittent every 8 hours  tamsulosin 0.4 milliGRAM(s) Oral at bedtime    MEDICATIONS  (PRN):  heparin   Injectable 6500 Unit(s) IV Push every 6 hours PRN For aPTT less than 40  heparin   Injectable 3000 Unit(s) IV Push every 6 hours PRN For aPTT between 40 - 57  ondansetron Injectable 4 milliGRAM(s) IV Push every 8 hours PRN Nausea and/or Vomiting      Vital Signs Last 24 Hrs  T(C): 37.7 (29 Jan 2022 13:58), Max: 37.8 (28 Jan 2022 20:52)  T(F): 99.8 (29 Jan 2022 13:58), Max: 100.1 (28 Jan 2022 20:52)  HR: 102 (29 Jan 2022 13:58) (100 - 105)  BP: 104/62 (29 Jan 2022 13:58) (101/56 - 143/79)  BP(mean): --  RR: 18 (29 Jan 2022 13:58) (17 - 18)  SpO2: 95% (29 Jan 2022 13:58) (95% - 98%)    PHYSICAL EXAM  per cotreatment    LABS:             8.6    37.21 )-----------( 435      ( 01-29 @ 15:13 )             25.8                8.3    34.86 )-----------( 417      ( 01-29 @ 08:08 )             24.7                8.7    37.35 )-----------( 418      ( 01-29 @ 00:41 )             25.8                9.5    44.77 )-----------( 462      ( 01-28 @ 14:12 )             28.4       01-29    136  |  106  |  18  ----------------------------<  98  3.6   |  25  |  1.20    Ca    7.8<L>      29 Jan 2022 08:08  Phos  3.1     01-29  Mg     2.0     01-29    TPro  6.3  /  Alb  1.6<L>  /  TBili  0.5  /  DBili  x   /  AST  15  /  ALT  20  /  AlkPhos  158<H>  01-29 01-29 @ 15:13  PT-- INR--  PTT51.2  01-29 @ 08:08  PT-- INR--  PTT65.4  01-29 @ 00:41  PT-- INR--  PTT41.6  01-28 @ 14:12  PT17.4 INR1.52  PTT28.2      RADIOLOGY & ADDITIONAL STUDIES:    IMPRESSION/RECOMMENDATIONS:

## 2022-01-29 NOTE — PROGRESS NOTE ADULT - PROBLEM SELECTOR PLAN 4
Na 132, K 3.1 on admission  -Hyponatremia likely due to low solute intake over 1 month  -S/p 2L bolus in ED, start 60cc NS   -Potassium tab x2  -Monitor daily BMP

## 2022-01-29 NOTE — PROGRESS NOTE ADULT - PROBLEM SELECTOR PLAN 6
GFR 58 on admission, GFR 60-70 since June 2019 per chart review, likely CKD stage 2  -No signs of SABRINA, Cr 1.3 on admission, Cr 1.2 in Oct 202  -Monitor renal indices closely

## 2022-01-29 NOTE — PROGRESS NOTE ADULT - PROBLEM SELECTOR PLAN 2
Outpatient CT scan reports showing findings most suggestive of multiple abscesses in the right hepatic lobe with associated thrombosis of the right hepatic vein consistent with septic thrombophlebitis  -Septic workup as above  -Continue Heparin gtt per GI

## 2022-01-29 NOTE — PROGRESS NOTE ADULT - SUBJECTIVE AND OBJECTIVE BOX
[facility]  Ray Coats PA-C  [facility] [place] [address]  [phone]      INTERVAL HPI/OVERNIGHT EVENTS:  Seen and examined  reports 4-5 episodes diarrhea   offers no further GI complaints      MEDICATIONS  (STANDING):  heparin  Infusion. 1600 Unit(s)/Hr (16 mL/Hr) IV Continuous <Continuous>  metroNIDAZOLE    Tablet 500 milliGRAM(s) Oral every 8 hours  pantoprazole    Tablet 40 milliGRAM(s) Oral before breakfast  piperacillin/tazobactam IVPB.. 3.375 Gram(s) IV Intermittent every 8 hours  tamsulosin 0.4 milliGRAM(s) Oral at bedtime    MEDICATIONS  (PRN):  heparin   Injectable 6500 Unit(s) IV Push every 6 hours PRN For aPTT less than 40  heparin   Injectable 3000 Unit(s) IV Push every 6 hours PRN For aPTT between 40 - 57  ondansetron Injectable 4 milliGRAM(s) IV Push every 8 hours PRN Nausea and/or Vomiting      Allergies    No Known Allergies    Intolerances        ROS:   General:  No wt loss, fevers, chills, night sweats, fatigue,   Eyes:  Good vision, no reported pain  ENT:  No sore throat, pain, runny nose, dysphagia  CV:  No pain, palpitations, hypo/hypertension  Resp:  No dyspnea, cough, tachypnea, wheezing  GI:  Pos diarrhea No pain, No nausea, No vomiting,, No constipation, No weight loss, No fever, No pruritis, No rectal bleeding, No tarry stools, No dysphagia,  :  No pain, bleeding, incontinence, nocturia  Muscle:  No pain, weakness  Neuro:  No weakness, tingling, memory problems  Psych:  No fatigue, insomnia, mood problems, depression  Endocrine:  No polyuria, polydipsia, cold/heat intolerance  Heme:  No petechiae, ecchymosis, easy bruisability  Skin:  No rash, tattoos, scars, edema      PHYSICAL EXAM:   Vital Signs:  Vital Signs Last 24 Hrs  T(C): 37.7 (2022 13:58), Max: 37.8 (2022 20:52)  T(F): 99.8 (2022 13:58), Max: 100.1 (2022 20:52)  HR: 102 (2022 13:58) (100 - 105)  BP: 104/62 (2022 13:58) (101/56 - 143/79)  BP(mean): --  RR: 18 (2022 13:58) (17 - 18)  SpO2: 95% (2022 13:58) (95% - 98%)  Daily     Daily     GENERAL:  Appears stated age,   HEENT:  NC/AT,    CHEST:  Full & symmetric excursion,   HEART:  Regular rhythm,  ABDOMEN:  Soft, non-tender, non-distended,  EXTEREMITIES:  no cyanosis  SKIN:  No rash  NEURO:  Alert,       LABS:                        8.6    37.21 )-----------( 435      ( 2022 15:13 )             25.8         136  |  106  |  18  ----------------------------<  98  3.6   |  25  |  1.20    Ca    7.8<L>      2022 08:08  Phos  3.1       Mg     2.0         TPro  6.3  /  Alb  1.6<L>  /  TBili  0.5  /  DBili  x   /  AST  15  /  ALT  20  /  AlkPhos  158<H>      PT/INR - ( 2022 14:12 )   PT: 17.4 sec;   INR: 1.52 ratio         PTT - ( 2022 15:13 )  PTT:51.2 sec  Urinalysis Basic - ( 2022 14:12 )    Color: Pale Yellow / Appearance: Clear / S.005 / pH: x  Gluc: x / Ketone: Negative  / Bili: Negative / Urobili: Negative   Blood: x / Protein: 30 mg/dL / Nitrite: Negative   Leuk Esterase: Negative / RBC: 3-5 /HPF / WBC 6-10   Sq Epi: x / Non Sq Epi: Occasional / Bacteria: Negative        RADIOLOGY & ADDITIONAL TESTS:

## 2022-01-29 NOTE — PROGRESS NOTE ADULT - PROBLEM SELECTOR PLAN 3
Outpatient CT scan showing moderate right pleural effusion with associated segmental and subsegmental consolidations in the posterior right middle lobe and right lower lobe may represent atelectasis and/or pneumonia  -Likely reactive in setting of hepatic abscess, however may need thoracentesis with IR and fluid analysis  -Continue IV Zosyn for possible PNA  -F/u blood cultures  -ID Dr. Brand consulted  -Pulm Dr. Hartley consulted, recs appreciated

## 2022-01-29 NOTE — PROGRESS NOTE ADULT - ASSESSMENT
6M PMH CLL, diverticulosis with diverticulitis episodes in the past (last one 2 years ago treated with PO abx), GERD, BPH, and HTN, admitted with diverticulitis. Also with liver abscess.

## 2022-01-29 NOTE — PROGRESS NOTE ADULT - ASSESSMENT
65 y/o man followed in our office w CLL, never requiring treatment, recent new sweats and chills and image studies c/w hepatic abscess, adm for management  Eval by IR, no drainage deposit    -CBC stable  -CLL stable no intervention needed  -continue hepatic abscess acute care  -will follow up in office after discharge    will sign off for now, please call if any questions

## 2022-01-29 NOTE — PROGRESS NOTE ADULT - SUBJECTIVE AND OBJECTIVE BOX
SUBJECTIVE:  Patient seen and examined at bedside.  Patient reports 3 episodes of nonbloody diarrhea since last night, possibly due to antibiotics. Otherwise feels well.  Denies abdominal pain, nausea, or vomiting.    VITALS  Vital Signs Last 24 Hrs  T(C): 36.8 (29 Jan 2022 05:47), Max: 38.2 (28 Jan 2022 14:09)  T(F): 98.3 (29 Jan 2022 05:47), Max: 100.7 (28 Jan 2022 14:09)  HR: 105 (29 Jan 2022 05:47) (100 - 124)  BP: 101/56 (29 Jan 2022 05:47) (101/56 - 143/79)  RR: 18 (29 Jan 2022 05:47) (15 - 18)  SpO2: 98% (29 Jan 2022 05:47) (96% - 98%)    PHYSICAL EXAM  GENERAL:  Well-nourished, well-developed male lying comfortably in bed in NAD.  HEENT:  Sclera white. Mucous membranes moist.  ABDOMEN:  Soft, nondistended, nontender;  No rebound tenderness or guarding.  SKIN:  No jaundice, pallor, or cyanosis  NEURO:  A&O x 3    INTAKE & OUTPUT  I&O's Summary    28 Jan 2022 07:01  -  29 Jan 2022 07:00  --------------------------------------------------------  IN: 904 mL / OUT: 0 mL / NET: 904 mL      MEDICATIONS  MEDICATIONS  (STANDING):  heparin  Infusion. 1600 Unit(s)/Hr (16 mL/Hr) IV Continuous <Continuous>  metroNIDAZOLE  IVPB 500 milliGRAM(s) IV Intermittent every 8 hours  pantoprazole    Tablet 40 milliGRAM(s) Oral before breakfast  piperacillin/tazobactam IVPB.. 3.375 Gram(s) IV Intermittent every 8 hours  tamsulosin 0.4 milliGRAM(s) Oral at bedtime    MEDICATIONS  (PRN):  heparin   Injectable 6500 Unit(s) IV Push every 6 hours PRN For aPTT less than 40  heparin   Injectable 3000 Unit(s) IV Push every 6 hours PRN For aPTT between 40 - 57  ondansetron Injectable 4 milliGRAM(s) IV Push every 8 hours PRN Nausea and/or Vomiting    LABS:                        8.3    34.86 )-----------( 417      ( 29 Jan 2022 08:08 )             24.7     01-29    136  |  106  |  18  ----------------------------<  98  3.6   |  25  |  1.20    Ca    7.8<L>      29 Jan 2022 08:08    TPro  6.3  /  Alb  1.6<L>  /  TBili  0.5  /  DBili  x   /  AST  15  /  ALT  20  /  AlkPhos  158<H>  01-29    PT/INR - ( 28 Jan 2022 14:12 )   PT: 17.4 sec;   INR: 1.52 ratio  PTT - ( 29 Jan 2022 08:08 )  PTT:65.4 sec

## 2022-01-29 NOTE — PROGRESS NOTE ADULT - SUBJECTIVE AND OBJECTIVE BOX
Date/Time Patient Seen:  		  Referring MD:   Data Reviewed	       Patient is a 66y old  Male who presents with a chief complaint of hepatic abscess (28 Jan 2022 18:11)      Subjective/HPI     PAST MEDICAL & SURGICAL HISTORY:  CLL (chronic lymphocytic leukemia)    Diverticulitis    HTN (hypertension)    Enlarged prostate    GERD (gastroesophageal reflux disease)    S/P inguinal hernia repair  bilat    S/P reconstruction of ligament of knee          Medication list         MEDICATIONS  (STANDING):  heparin  Infusion.  Unit(s)/Hr (14 mL/Hr) IV Continuous <Continuous>  pantoprazole    Tablet 40 milliGRAM(s) Oral before breakfast  piperacillin/tazobactam IVPB.. 3.375 Gram(s) IV Intermittent every 8 hours  sodium chloride 0.9%. 1000 milliLiter(s) (60 mL/Hr) IV Continuous <Continuous>  tamsulosin 0.4 milliGRAM(s) Oral at bedtime    MEDICATIONS  (PRN):  heparin   Injectable 6500 Unit(s) IV Push every 6 hours PRN For aPTT less than 40  heparin   Injectable 3000 Unit(s) IV Push every 6 hours PRN For aPTT between 40 - 57  ondansetron Injectable 4 milliGRAM(s) IV Push every 8 hours PRN Nausea and/or Vomiting         Vitals log        ICU Vital Signs Last 24 Hrs  T(C): 37.8 (28 Jan 2022 20:52), Max: 38.2 (28 Jan 2022 14:09)  T(F): 100.1 (28 Jan 2022 20:52), Max: 100.7 (28 Jan 2022 14:09)  HR: 100 (28 Jan 2022 20:52) (100 - 124)  BP: 143/79 (28 Jan 2022 20:52) (105/62 - 143/79)  BP(mean): --  ABP: --  ABP(mean): --  RR: 17 (28 Jan 2022 20:52) (15 - 18)  SpO2: 96% (28 Jan 2022 20:52) (96% - 98%)           Input and Output:  I&O's Detail      Lab Data                        8.7    37.35 )-----------( 418      ( 29 Jan 2022 00:41 )             25.8     01-28    132<L>  |  99  |  25<H>  ----------------------------<  105<H>  3.1<L>   |  25  |  1.30    Ca    8.3<L>      28 Jan 2022 14:12    TPro  7.4  /  Alb  1.9<L>  /  TBili  0.5  /  DBili  x   /  AST  24  /  ALT  28  /  AlkPhos  207<H>  01-28            Review of Systems	      Objective     Physical Examination    heart s1s2  lung dec BS  abd soft  head nc  on RA    Pertinent Lab findings & Imaging      Francoise:  NO   Adequate UO     I&O's Detail           Discussed with:     Cultures:	        Radiology

## 2022-01-29 NOTE — PROGRESS NOTE ADULT - PROBLEM SELECTOR PLAN 7
Diagnosed in 2017, not undergoing active treatment  -Follows outpatient with H/O Dr. Tubbs  -Baseline WBC 24 MRCP noted for 1cm adrenal nodule on left   Endocrine Dr. Perlman to see her

## 2022-01-30 LAB
-  STREPTOCOCCUS ANGINOSUS GROUP: SIGNIFICANT CHANGE UP
ALBUMIN SERPL ELPH-MCNC: 1.6 G/DL — LOW (ref 3.3–5)
ALP SERPL-CCNC: 144 U/L — HIGH (ref 40–120)
ALT FLD-CCNC: 22 U/L — SIGNIFICANT CHANGE UP (ref 12–78)
ANION GAP SERPL CALC-SCNC: 5 MMOL/L — SIGNIFICANT CHANGE UP (ref 5–17)
AST SERPL-CCNC: 17 U/L — SIGNIFICANT CHANGE UP (ref 15–37)
BASOPHILS # BLD AUTO: 0.14 K/UL — SIGNIFICANT CHANGE UP (ref 0–0.2)
BASOPHILS NFR BLD AUTO: 0.5 % — SIGNIFICANT CHANGE UP (ref 0–2)
BILIRUB SERPL-MCNC: 0.4 MG/DL — SIGNIFICANT CHANGE UP (ref 0.2–1.2)
BUN SERPL-MCNC: 16 MG/DL — SIGNIFICANT CHANGE UP (ref 7–23)
CALCIUM SERPL-MCNC: 8.2 MG/DL — LOW (ref 8.5–10.1)
CHLORIDE SERPL-SCNC: 109 MMOL/L — HIGH (ref 96–108)
CO2 SERPL-SCNC: 24 MMOL/L — SIGNIFICANT CHANGE UP (ref 22–31)
CREAT SERPL-MCNC: 1.2 MG/DL — SIGNIFICANT CHANGE UP (ref 0.5–1.3)
EOSINOPHIL # BLD AUTO: 0.08 K/UL — SIGNIFICANT CHANGE UP (ref 0–0.5)
EOSINOPHIL NFR BLD AUTO: 0.3 % — SIGNIFICANT CHANGE UP (ref 0–6)
GLUCOSE SERPL-MCNC: 115 MG/DL — HIGH (ref 70–99)
GRAM STN FLD: SIGNIFICANT CHANGE UP
HCT VFR BLD CALC: 25.5 % — LOW (ref 39–50)
HGB BLD-MCNC: 8.4 G/DL — LOW (ref 13–17)
IMM GRANULOCYTES NFR BLD AUTO: 1 % — SIGNIFICANT CHANGE UP (ref 0–1.5)
LYMPHOCYTES # BLD AUTO: 18.68 K/UL — HIGH (ref 1–3.3)
LYMPHOCYTES # BLD AUTO: 61.5 % — HIGH (ref 13–44)
MCHC RBC-ENTMCNC: 30.1 PG — SIGNIFICANT CHANGE UP (ref 27–34)
MCHC RBC-ENTMCNC: 32.9 GM/DL — SIGNIFICANT CHANGE UP (ref 32–36)
MCV RBC AUTO: 91.4 FL — SIGNIFICANT CHANGE UP (ref 80–100)
METHOD TYPE: SIGNIFICANT CHANGE UP
MONOCYTES # BLD AUTO: 0.56 K/UL — SIGNIFICANT CHANGE UP (ref 0–0.9)
MONOCYTES NFR BLD AUTO: 1.8 % — LOW (ref 2–14)
NEUTROPHILS # BLD AUTO: 10.6 K/UL — HIGH (ref 1.8–7.4)
NEUTROPHILS NFR BLD AUTO: 34.9 % — LOW (ref 43–77)
NRBC # BLD: 0 /100 WBCS — SIGNIFICANT CHANGE UP (ref 0–0)
PLATELET # BLD AUTO: 443 K/UL — HIGH (ref 150–400)
POTASSIUM SERPL-MCNC: 3.7 MMOL/L — SIGNIFICANT CHANGE UP (ref 3.5–5.3)
POTASSIUM SERPL-SCNC: 3.7 MMOL/L — SIGNIFICANT CHANGE UP (ref 3.5–5.3)
PROT SERPL-MCNC: 6.3 G/DL — SIGNIFICANT CHANGE UP (ref 6–8.3)
RBC # BLD: 2.79 M/UL — LOW (ref 4.2–5.8)
RBC # FLD: 13.8 % — SIGNIFICANT CHANGE UP (ref 10.3–14.5)
SODIUM SERPL-SCNC: 138 MMOL/L — SIGNIFICANT CHANGE UP (ref 135–145)
WBC # BLD: 30.37 K/UL — HIGH (ref 3.8–10.5)
WBC # FLD AUTO: 30.37 K/UL — HIGH (ref 3.8–10.5)

## 2022-01-30 PROCEDURE — 99231 SBSQ HOSP IP/OBS SF/LOW 25: CPT

## 2022-01-30 PROCEDURE — 99233 SBSQ HOSP IP/OBS HIGH 50: CPT

## 2022-01-30 RX ADMIN — HEPARIN SODIUM 1800 UNIT(S)/HR: 5000 INJECTION INTRAVENOUS; SUBCUTANEOUS at 19:12

## 2022-01-30 RX ADMIN — HEPARIN SODIUM 1800 UNIT(S)/HR: 5000 INJECTION INTRAVENOUS; SUBCUTANEOUS at 07:08

## 2022-01-30 RX ADMIN — PIPERACILLIN AND TAZOBACTAM 25 GRAM(S): 4; .5 INJECTION, POWDER, LYOPHILIZED, FOR SOLUTION INTRAVENOUS at 21:39

## 2022-01-30 RX ADMIN — PIPERACILLIN AND TAZOBACTAM 25 GRAM(S): 4; .5 INJECTION, POWDER, LYOPHILIZED, FOR SOLUTION INTRAVENOUS at 05:20

## 2022-01-30 RX ADMIN — Medication 1 TABLET(S): at 17:35

## 2022-01-30 RX ADMIN — PANTOPRAZOLE SODIUM 40 MILLIGRAM(S): 20 TABLET, DELAYED RELEASE ORAL at 05:19

## 2022-01-30 RX ADMIN — TAMSULOSIN HYDROCHLORIDE 0.4 MILLIGRAM(S): 0.4 CAPSULE ORAL at 21:39

## 2022-01-30 RX ADMIN — PIPERACILLIN AND TAZOBACTAM 25 GRAM(S): 4; .5 INJECTION, POWDER, LYOPHILIZED, FOR SOLUTION INTRAVENOUS at 14:03

## 2022-01-30 RX ADMIN — Medication 500 MILLIGRAM(S): at 05:19

## 2022-01-30 RX ADMIN — Medication 1 TABLET(S): at 07:10

## 2022-01-30 NOTE — CONSULT NOTE ADULT - SUBJECTIVE AND OBJECTIVE BOX
Covering [facility], Division of Infectious Diseases  MELE Barrios, SATYA Cannon, BLAKE Arcos    HPI:  67 yo male PMH CLL, HTN, BPH hx diverticulitis ( last flare 18 months ago ) who presented to the hospital with cc of chill and feeling unwell for past month found to have  multiple abscesses in the right hepatic lobe with associated thrombosis of the right hepatic vein consistent with septic thrombophlebitis with Findings likely representing acute diverticulitis of the distal descending colon on outpt CT AP .Tmax 100.7 in ED WBC 44K. Blood cultures with Strep species.    Infecious Disease consult was called to evaluate pt and for antibiotic management.        Past Medical & Surgical Hx:  PAST MEDICAL & SURGICAL HISTORY:  CLL (chronic lymphocytic leukemia)  Diverticulitis  HTN (hypertension)  Enlarged prostate  GERD (gastroesophageal reflux disease)  S/P inguinal hernia repair  bilat  S/P reconstruction of ligament of knee      Social History--  EtOH: denies   Tobacco: denies   Drug Use: denies       FAMILY HISTORY:  Family history of breast cancer (Mother)      Allergies  No Known Allergies    Intolerances  none      Home/ Out patient  Medications :    Current Inpatient Medications :    ANTIBIOTICS:   metroNIDAZOLE    Tablet 500 milliGRAM(s) Oral every 8 hours  piperacillin/tazobactam IVPB.. 3.375 Gram(s) IV Intermittent every 8 hours      OTHER RELEVANT MEDICATIONS :  heparin   Injectable 6500 Unit(s) IV Push every 6 hours PRN  heparin   Injectable 3000 Unit(s) IV Push every 6 hours PRN  heparin  Infusion. 1600 Unit(s)/Hr IV Continuous <Continuous>  ondansetron Injectable 4 milliGRAM(s) IV Push every 8 hours PRN  pantoprazole    Tablet 40 milliGRAM(s) Oral before breakfast  tamsulosin 0.4 milliGRAM(s) Oral at bedtime      ROS:  CONSTITUTIONAL: + fever or chills  EYES:  Negative  blurry vision or double vision  CARDIOVASCULAR:  Negative for chest pain or palpitations  RESPIRATORY:  Negative for cough, wheezing, or SOB   GASTROINTESTINAL:  Negative for nausea, vomiting, diarrhea, constipation, or abdominal pain  GENITOURINARY:  Negative frequency, urgency , dysuria or hematuria   NEUROLOGIC:  No headache, confusion, dizziness, lightheadedness  All other systems were reviewed and are negative          I&O's Detail    29 Jan 2022 07:01  -  30 Jan 2022 07:00  --------------------------------------------------------  IN:    Heparin Infusion: 184 mL    sodium chloride 0.9%: 180 mL  Total IN: 364 mL    OUT:    Voided (mL): 400 mL  Total OUT: 400 mL    Total NET: -36 mL          Physical Exam:  Vital Signs Last 24 Hrs  T(C): 37.2 (29 Jan 2022 20:46), Max: 37.7 (29 Jan 2022 13:58)  T(F): 98.9 (29 Jan 2022 20:46), Max: 99.8 (29 Jan 2022 13:58)  HR: 102 (29 Jan 2022 20:46) (102 - 102)  BP: 110/69 (29 Jan 2022 20:46) (104/62 - 110/69)  RR: 18 (29 Jan 2022 20:46) (18 - 18)  SpO2: 92% (29 Jan 2022 20:46) (92% - 95%)      General:  no acute distress  Neck: supple, trachea midline  Lungs: clear, no wheeze/rhonchi  Cardiovascular: regular rate and rhythm, S1 S2  Abdomen: soft, nontender, ND, bowel sounds normal  Neurological:  alert and oriented x3  Skin: no rash  Extremities: no edema    Labs:                         8.4    30.37 )-----------( 443      ( 30 Jan 2022 08:42 )             25.5   01-30    138  |  109<H>  |  16  ----------------------------<  115<H>  3.7   |  24  |  1.20    Ca    8.2<L>      30 Jan 2022 08:42  Phos  3.1     01-29  Mg     2.0     01-29    TPro  6.3  /  Alb  1.6<L>  /  TBili  0.4  /  DBili  x   /  AST  17  /  ALT  22  /  AlkPhos  144<H>  01-30      RECENT CULTURES:    Culture - Urine (collected 28 Jan 2022 19:03)  Source: Clean Catch Clean Catch (Midstream)  Final Report (29 Jan 2022 14:35):    <10,000 CFU/mL Normal Urogenital Kera    Culture - Blood (collected 28 Jan 2022 18:52)  Source: .Blood Blood-Peripheral  Gram Stain (30 Jan 2022 10:56):    Growth in anaerobic bottle: Gram Positive Cocci in Pairs and Chains  Preliminary Report (30 Jan 2022 10:56):    Growth in anaerobic bottle: Gram Positive Cocci in Pairs and Chains    ***Blood Panel PCR results on this specimen are available    approximately 3 hours after the Gram stain result.***    Gram stain, PCR, and/or culture results may not always    correspond due to difference in methodologies.    ************************************************************    This PCR assay was performed by multiplex PCR. This    Assay tests for 66 bacterial and resistance gene targets.    Please refer to the [facility] test directory    at [email or web address] for details.    Culture - Blood (collected 28 Jan 2022 18:52)  Source: .Blood Blood-Peripheral  Preliminary Report (29 Jan 2022 19:02):    No growth to date.    RADIOLOGY & ADDITIONAL STUDIES:    ACC: [id number] EXAM:  XR CHEST PORTABLE URGENT 1V                          PROCEDURE DATE:  01/28/2022          INTERPRETATION:  Exam:XR CHEST URGENT    clinical history:Sepsis    Comparison is made to priors. Stable right pleural effusion. Stable left   basilar atelectasis. No pneumothorax.    IMPRESSION: Stable findings as above      Assessment :   67 yo male PMH CLL, HTN, BPH hx diverticulitis ( last flare 18 months ago ) strep bacteremia sec liver abscesses likely due to Acute diverticulitis.   Pleural effussion and atelectasis - doubt pneumonia  Wbc downtrending - Pt has CLL thus not good marker for infectious process    Plan :   Cont Zosyn  Dc Flagyl  IR drainage of Liver abscess if amenable  Repeat blood cultures  Trend temps and cbc  Serial abd exams    Dr Brand to resume care in am    Continue with present regiment .  Approptiate use of antibiotics and adverse effects reviewed.      I have discussed the above plan of care with patient in detail. He expressed understanding of the treatment plan . Risks, benefits and alternatives discussed in detail. I have asked if he has any questions or concerns and appropriately addressed them to the best of my ability .      > 45 minutes spent in direct patient care reviewing  the notes, lab data/ imaging , discussion with multidisciplinary team. All questions were addressed and answered to the best of my capacity .    Thank you for allowing me to participate in the care of your patient .      Trinity Tubbs MD  Infectious Disease  [phone] Covering [facility], Division of Infectious Diseases  MELE Barrios, SATYA Cannon, BLAKE Arcos    HPI:  67 yo male PMH CLL, HTN, BPH hx diverticulitis ( last flare 18 months ago ) who presented to the hospital with cc of chill and feeling unwell for past month found to have  multiple abscesses in the right hepatic lobe with associated thrombosis of the right hepatic vein consistent with septic thrombophlebitis with Findings likely representing acute diverticulitis of the distal descending colon on outpt CT AP .Tmax 100.7 in ED WBC 44K. Blood cultures with Strep species.    Infecious Disease consult was called to evaluate pt and for antibiotic management.        Past Medical & Surgical Hx:  PAST MEDICAL & SURGICAL HISTORY:  CLL (chronic lymphocytic leukemia)  Diverticulitis  HTN (hypertension)  Enlarged prostate  GERD (gastroesophageal reflux disease)  S/P inguinal hernia repair  bilat  S/P reconstruction of ligament of knee      Social History--  EtOH: denies   Tobacco: denies   Drug Use: denies       FAMILY HISTORY:  Family history of breast cancer (Mother)      Allergies  No Known Allergies    Intolerances  none      Home/ Out patient  Medications :    Current Inpatient Medications :    ANTIBIOTICS:   metroNIDAZOLE    Tablet 500 milliGRAM(s) Oral every 8 hours  piperacillin/tazobactam IVPB.. 3.375 Gram(s) IV Intermittent every 8 hours      OTHER RELEVANT MEDICATIONS :  heparin   Injectable 6500 Unit(s) IV Push every 6 hours PRN  heparin   Injectable 3000 Unit(s) IV Push every 6 hours PRN  heparin  Infusion. 1600 Unit(s)/Hr IV Continuous <Continuous>  ondansetron Injectable 4 milliGRAM(s) IV Push every 8 hours PRN  pantoprazole    Tablet 40 milliGRAM(s) Oral before breakfast  tamsulosin 0.4 milliGRAM(s) Oral at bedtime      ROS:  CONSTITUTIONAL: + fever or chills  EYES:  Negative  blurry vision or double vision  CARDIOVASCULAR:  Negative for chest pain or palpitations  RESPIRATORY:  Negative for cough, wheezing, or SOB   GASTROINTESTINAL:  Negative for nausea, vomiting, diarrhea, constipation, or abdominal pain  GENITOURINARY:  Negative frequency, urgency , dysuria or hematuria   NEUROLOGIC:  No headache, confusion, dizziness, lightheadedness  All other systems were reviewed and are negative          I&O's Detail    29 Jan 2022 07:01  -  30 Jan 2022 07:00  --------------------------------------------------------  IN:    Heparin Infusion: 184 mL    sodium chloride 0.9%: 180 mL  Total IN: 364 mL    OUT:    Voided (mL): 400 mL  Total OUT: 400 mL    Total NET: -36 mL          Physical Exam:  Vital Signs Last 24 Hrs  T(C): 37.2 (29 Jan 2022 20:46), Max: 37.7 (29 Jan 2022 13:58)  T(F): 98.9 (29 Jan 2022 20:46), Max: 99.8 (29 Jan 2022 13:58)  HR: 102 (29 Jan 2022 20:46) (102 - 102)  BP: 110/69 (29 Jan 2022 20:46) (104/62 - 110/69)  RR: 18 (29 Jan 2022 20:46) (18 - 18)  SpO2: 92% (29 Jan 2022 20:46) (92% - 95%)      General:  no acute distress  Neck: supple, trachea midline  Lungs: clear, no wheeze/rhonchi  Cardiovascular: regular rate and rhythm, S1 S2  Abdomen: soft, nontender, ND, bowel sounds normal  Neurological:  alert and oriented x3  Skin: no rash  Extremities: no edema    Labs:                         8.4    30.37 )-----------( 443      ( 30 Jan 2022 08:42 )             25.5   01-30    138  |  109<H>  |  16  ----------------------------<  115<H>  3.7   |  24  |  1.20    Ca    8.2<L>      30 Jan 2022 08:42  Phos  3.1     01-29  Mg     2.0     01-29    TPro  6.3  /  Alb  1.6<L>  /  TBili  0.4  /  DBili  x   /  AST  17  /  ALT  22  /  AlkPhos  144<H>  01-30      RECENT CULTURES:    Culture - Urine (collected 28 Jan 2022 19:03)  Source: Clean Catch Clean Catch (Midstream)  Final Report (29 Jan 2022 14:35):    <10,000 CFU/mL Normal Urogenital Kera    Culture - Blood (collected 28 Jan 2022 18:52)  Source: .Blood Blood-Peripheral  Gram Stain (30 Jan 2022 10:56):    Growth in anaerobic bottle: Gram Positive Cocci in Pairs and Chains  Preliminary Report (30 Jan 2022 10:56):    Growth in anaerobic bottle: Gram Positive Cocci in Pairs and Chains    ***Blood Panel PCR results on this specimen are available    approximately 3 hours after the Gram stain result.***    Gram stain, PCR, and/or culture results may not always    correspond due to difference in methodologies.    ************************************************************    This PCR assay was performed by multiplex PCR. This    Assay tests for 66 bacterial and resistance gene targets.    Please refer to the [facility] test directory    at [email or web address] for details.    Culture - Blood (collected 28 Jan 2022 18:52)  Source: .Blood Blood-Peripheral  Preliminary Report (29 Jan 2022 19:02):    No growth to date.    RADIOLOGY & ADDITIONAL STUDIES:    ACC: [id number] EXAM:  XR CHEST PORTABLE URGENT 1V                          PROCEDURE DATE:  01/28/2022          INTERPRETATION:  Exam:XR CHEST URGENT    clinical history:Sepsis    Comparison is made to priors. Stable right pleural effusion. Stable left   basilar atelectasis. No pneumothorax.    IMPRESSION: Stable findings as above    OUTPT imaging:  CT chest/abd/p: Findings most suggestive of multiple abscesses in the right hepatic lobe with associated thrombosis of the right hepatic vein consistent with septic thrombophlebitis.   -Findings likely representing acute diverticulitis of the distal descending colon.  -Moderate right pleural effusion with associated segmental and subsegmental consolidations in the posterior right middle lobe and right lower lobe may represent atelectasis and/or pneumonia.  -Indeterminate 1 cm left adrenal nodule.      Assessment :   67 yo male PMH CLL, HTN, BPH hx diverticulitis ( last flare 18 months ago ) strep bacteremia sec liver abscesses likely due to Acute diverticulitis.   +thrombosis of the right hepatic vein consistent with septic thrombophlebitis.   Pleural effussion and atelectasis - doubt pneumonia  Wbc downtrending - Pt has CLL thus not good marker for infectious process    Plan :   Cont Zosyn  Dc Flagyl  IR drainage of Liver abscess if amenable  Repeat blood cultures  Trend temps and cbc  AC per primary team  Serial abd exams    Dr Roxanna Brand to resume care in am    Continue with present regiment .  Approptiate use of antibiotics and adverse effects reviewed.      I have discussed the above plan of care with patient in detail. He expressed understanding of the treatment plan . Risks, benefits and alternatives discussed in detail. I have asked if he has any questions or concerns and appropriately addressed them to the best of my ability .      > 45 minutes spent in direct patient care reviewing  the notes, lab data/ imaging , discussion with multidisciplinary team. All questions were addressed and answered to the best of my capacity .    Thank you for allowing me to participate in the care of your patient .      Trinity Tubbs MD  Infectious Disease  [phone]

## 2022-01-30 NOTE — PROGRESS NOTE ADULT - ASSESSMENT
liver abscess  diverticulitis  abdominal pain    regular diet  iv antibiotics  ID eval  f/u cultures  can send GI pcr if diarrhea continues   ? septic thrombophlebitis; start hep gtt  check MRI/MRCP with contrast  may need picc  IR for drainage tomorrow   will follow    Advanced care planning was discussed with patient and family.  Advanced care planning forms were reviewed and discussed.  Risks, benefits and alternatives of gastroenterologic procedures were discussed in detail and all questions were answered.    30 minutes spent.

## 2022-01-30 NOTE — PROGRESS NOTE ADULT - ASSESSMENT
67 yo male PMH CLL (not undergoing treatment), HTN, BPH presents to ED after outpatient CT scan showed multiple hepatic abscess, also with fatigue, rigors, fevers, myalgias, poor PO intake for past 1 month, admitted with sepsis 2/2 hepatic abscesses and thrombophlebitis of right hepatic vein.

## 2022-01-30 NOTE — PROVIDER CONTACT NOTE (CRITICAL VALUE NOTIFICATION) - TEST AND RESULT REPORTED:
WBC
BC positive growth in aerobic bottle gram positive cocci in pairs and chains
BC positive growth in anaerobic bottle positive cocci in pairs and chairs

## 2022-01-30 NOTE — PROGRESS NOTE ADULT - PROBLEM SELECTOR PLAN 1
-Outpatient CT scan reports showing findings most suggestive of multiple abscesses in the right hepatic lobe with associated thrombosis of the right hepatic vein consistent with septic thrombophlebitis  -Continue IV Zosyn, Po flagyl   - GI ok to start diet. d/c fluids   -F/u blood cultures, UA nonspecific, f/u urine cultures  - MRCP results noted   -No acute surgical intervention at this time per Gen Surg  - Will probably  need IR drainage pending response and clinical course  -GI Dr. Acosta consulted  -ID Dr. Kayce Zhao  -Surg Dr. Carey

## 2022-01-30 NOTE — PROGRESS NOTE ADULT - SUBJECTIVE AND OBJECTIVE BOX
[facility]  Ray Coats PA-C  [address] [place] [address]  [phone]      INTERVAL HPI/OVERNIGHT EVENTS:  Seen and examined, chart reviewed  reports improvements in diarrhea  offers no further GI complaints            MEDICATIONS  (STANDING):  heparin  Infusion. 1600 Unit(s)/Hr (16 mL/Hr) IV Continuous <Continuous>  lactobacillus acidophilus 1 Tablet(s) Oral two times a day with meals  pantoprazole    Tablet 40 milliGRAM(s) Oral before breakfast  piperacillin/tazobactam IVPB.. 3.375 Gram(s) IV Intermittent every 8 hours  tamsulosin 0.4 milliGRAM(s) Oral at bedtime    MEDICATIONS  (PRN):  heparin   Injectable 6500 Unit(s) IV Push every 6 hours PRN For aPTT less than 40  heparin   Injectable 3000 Unit(s) IV Push every 6 hours PRN For aPTT between 40 - 57  ondansetron Injectable 4 milliGRAM(s) IV Push every 8 hours PRN Nausea and/or Vomiting      Allergies    No Known Allergies    Intolerances            ROS:   General:  No wt loss, fevers, chills, night sweats, fatigue,   Eyes:  Good vision, no reported pain  ENT:  No sore throat, pain, runny nose, dysphagia  CV:  No pain, palpitations, hypo/hypertension  Resp:  No dyspnea, cough, tachypnea, wheezing  GI:  Pos diarrhea No pain, No nausea, No vomiting,, No constipation, No weight loss, No fever, No pruritis, No rectal bleeding, No tarry stools, No dysphagia,  :  No pain, bleeding, incontinence, nocturia  Muscle:  No pain, weakness  Neuro:  No weakness, tingling, memory problems  Psych:  No fatigue, insomnia, mood problems, depression  Endocrine:  No polyuria, polydipsia, cold/heat intolerance  Heme:  No petechiae, ecchymosis, easy bruisability  Skin:  No rash, tattoos, scars, edema      PHYSICAL EXAM  Vital Signs Last 24 Hrs  T(C): 37.2 (29 Jan 2022 20:46), Max: 37.7 (29 Jan 2022 13:58)  T(F): 98.9 (29 Jan 2022 20:46), Max: 99.8 (29 Jan 2022 13:58)  HR: 102 (29 Jan 2022 20:46) (102 - 102)  BP: 110/69 (29 Jan 2022 20:46) (104/62 - 110/69)  BP(mean): --  RR: 18 (29 Jan 2022 20:46) (18 - 18)  SpO2: 92% (29 Jan 2022 20:46) (92% - 95%)          GENERAL:  Appears stated age,   HEENT:  NC/AT,    CHEST:  Full & symmetric excursion,   HEART:  Regular rhythm,  ABDOMEN:  Soft, non-tender, non-distended,  EXTEREMITIES:  no cyanosis  SKIN:  No rash  NEURO:  Alert,       LABS:                        8.4    30.37 )-----------( 443      ( 30 Jan 2022 08:42 )             25.5     01-30    138  |  109<H>  |  16  ----------------------------<  115<H>  3.7   |  24  |  1.20    Ca    8.2<L>      30 Jan 2022 08:42  Phos  3.1     01-29  Mg     2.0     01-29    TPro  6.3  /  Alb  1.6<L>  /  TBili  0.4  /  DBili  x   /  AST  17  /  ALT  22  /  AlkPhos  144<H>  01-30    PT/INR - ( 28 Jan 2022 14:12 )   PT: 17.4 sec;   INR: 1.52 ratio         PTT - ( 29 Jan 2022 22:43 )  PTT:59.8 sec      01-29-22 @ 07:01  -  01-30-22 @ 07:00  --------------------------------------------------------  IN: 364 mL / OUT: 400 mL / NET: -36 mL                Culture - Urine (collected 28 Jan 2022 19:03)  Source: Clean Catch Clean Catch (Midstream)  Final Report (29 Jan 2022 14:35):    <10,000 CFU/mL Normal Urogenital Kera    Culture - Blood (collected 28 Jan 2022 18:52)  Source: .Blood Blood-Peripheral  Gram Stain (30 Jan 2022 10:56):    Growth in anaerobic bottle: Gram Positive Cocci in Pairs and Chains  Preliminary Report (30 Jan 2022 10:56):    Growth in anaerobic bottle: Gram Positive Cocci in Pairs and Chains    ***Blood Panel PCR results on this specimen are available    approximately 3 hours after the Gram stain result.***    Gram stain, PCR, and/or culture results may not always    correspond due to difference in methodologies.    ************************************************************    This PCR assay was performed by multiplex PCR. This    Assay tests for 66 bacterial and resistance gene targets.    Please refer to the [facility] test directory    at [email or web address] for details.    Culture - Blood (collected 28 Jan 2022 18:52)  Source: .Blood Blood-Peripheral  Preliminary Report (29 Jan 2022 19:02):    No growth to date.        RADIOLOGY & ADDITIONAL TESTS:

## 2022-01-30 NOTE — PROGRESS NOTE ADULT - SUBJECTIVE AND OBJECTIVE BOX
Patient is a 66y old  Male who presents with a chief complaint of hepatic abscess (30 Jan 2022 05:06)      INTERVAL HPI/OVERNIGHT EVENTS: Seen and examined at bedside. Denies chest pain, palpitation, sob     MEDICATIONS  (STANDING):  heparin  Infusion. 1600 Unit(s)/Hr (16 mL/Hr) IV Continuous <Continuous>  lactobacillus acidophilus 1 Tablet(s) Oral two times a day with meals  metroNIDAZOLE    Tablet 500 milliGRAM(s) Oral every 8 hours  pantoprazole    Tablet 40 milliGRAM(s) Oral before breakfast  piperacillin/tazobactam IVPB.. 3.375 Gram(s) IV Intermittent every 8 hours  tamsulosin 0.4 milliGRAM(s) Oral at bedtime    MEDICATIONS  (PRN):  heparin   Injectable 6500 Unit(s) IV Push every 6 hours PRN For aPTT less than 40  heparin   Injectable 3000 Unit(s) IV Push every 6 hours PRN For aPTT between 40 - 57  ondansetron Injectable 4 milliGRAM(s) IV Push every 8 hours PRN Nausea and/or Vomiting      Allergies    No Known Allergies    Intolerances        REVIEW OF SYSTEMS:  CONSTITUTIONAL: No fever or chills   EYES: No eye pain, visual disturbances, or discharge  ENMT:  No difficulty hearing, tinnitus, vertigo; No sinus or throat pain  NECK: No pain or stiffness  RESPIRATORY: No cough, wheezing, chills or hemoptysis; No shortness of breath  CARDIOVASCULAR: No chest pain, palpitations, dizziness, or leg swelling  GASTROINTESTINAL: No abdominal or epigastric pain. No nausea, vomiting, or hematemesis; No diarrhea or constipation.   GENITOURINARY: No dysuria, frequency, hematuria, or incontinence  NEUROLOGICAL: No headaches, memory loss, loss of strength, numbness, or tremors  SKIN: No itching, burning, rashes, or lesions   LYMPH NODES: No enlarged glands  MUSCULOSKELETAL: No joint pain or swelling; No muscle, back, or extremity pain  HEME/LYMPH: No easy bruising, or bleeding gums  ALLERGY AND IMMUNOLOGIC: No hives or eczema    Vital Signs Last 24 Hrs  T(C): 37.2 (29 Jan 2022 20:46), Max: 37.7 (29 Jan 2022 13:58)  T(F): 98.9 (29 Jan 2022 20:46), Max: 99.8 (29 Jan 2022 13:58)  HR: 102 (29 Jan 2022 20:46) (102 - 102)  BP: 110/69 (29 Jan 2022 20:46) (104/62 - 110/69)  BP(mean): --  RR: 18 (29 Jan 2022 20:46) (18 - 18)  SpO2: 92% (29 Jan 2022 20:46) (92% - 95%)    PHYSICAL EXAM:  GENERAL: NAD, well-groomed, well-developed  HEAD:  Atraumatic, Normocephalic  EYES: EOMI, PERRLA, conjunctiva and sclera clear  ENMT: No tonsillar erythema, exudates, or enlargement; Moist mucous membranes  NECK: Supple, No JVD, Normal thyroid  NERVOUS SYSTEM:  Alert & Oriented X3, Good concentration; Motor Strength 5/5 B/L upper and lower extremities  CHEST/LUNG: Clear to auscultation bilaterally; No rales, rhonchi, wheezing  HEART: Regular rate and rhythm; No murmurs, rubs, or gallops  ABDOMEN: Soft, Nontender, Nondistended; Bowel sounds present  EXTREMITIES:  2+ Peripheral Pulses, No clubbing, cyanosis, or edema  LYMPH: No lymphadenopathy noted  SKIN: No rashes or lesions    LABS:                        8.4    30.37 )-----------( 443      ( 30 Jan 2022 08:42 )             25.5       Ca    7.8        29 Jan 2022 08:08      PT/INR - ( 28 Jan 2022 14:12 )   PT: 17.4 sec;   INR: 1.52 ratio         PTT - ( 29 Jan 2022 22:43 )  PTT:59.8 sec  CAPILLARY BLOOD GLUCOSE        BLOOD CULTURE  01-28 @ 19:03   <10,000 CFU/mL Normal Urogenital Kera  --  --  01-28 @ 18:52   No growth to date.  --  --    RADIOLOGY & ADDITIONAL TESTS:    Imaging Personally Reviewed:  [ ] YES     Consultant(s) Notes Reviewed:      Care Discussed with Consultants/Other Providers:

## 2022-01-30 NOTE — CONSULT NOTE ADULT - CONSULT REQUESTED DATE/TIME
28-Jan-2022 18:12
28-Jan-2022 16:53
28-Jan-2022 17:29
30-Jan-2022 12:26
30-Jan-2022 15:33
28-Jan-2022 16:39

## 2022-01-30 NOTE — PROGRESS NOTE ADULT - ASSESSMENT
67 yo male with pmhx of CLL diverticulitis GERD and HTN sent in by Dr. Tubbs for liver abscess seen on out pt CT.    intraabdominal abscess  thrombosis of the right hepatic vein consistent with septic thrombophlebitis  CLL  hx of Diverticular Disease of Intestine  Pleural eff - Atelectasis  Adrenal nodule    MRI noted - confirmed CT findings - on ABX - on Hep Ac -   HEME onc eval noted  on RA    I darryl  DVT p = ac = for thrombosis  emp ABX - ID eval - cx and biomarkers -   will need IR drain and sampling of fluid - gram stain and cx  will consider - discuss with IR - Pleural eff drainage - likely reactive from the process of Abscess - Hepatic -   CLL management - Onc following  CT abd chest reviewed from outpatient  monitor VS and HD and Sat - keep sat > 88 pct  Surgery Eval noted  cvs rx regimen and BP control  Replete Lytes -

## 2022-01-30 NOTE — CONSULT NOTE ADULT - SUBJECTIVE AND OBJECTIVE BOX
Patient is a 66y old  Male who presents with a chief complaint of hepatic abscess (31 Jan 2022 05:19)      Reason For Consult: adrenal nodule    HPI:  65 yo male PMH CLL (not undergoing treatment), HTN, BPH presents to ED after outpatient CT scan showed multiple hepatic abscess, sent by H/O Dr. uTbbs. Patient states for past 1-2 months he has had intermittent weakness, body aches, chills, fatigue, exertional dyspnea. Patient states over [place] he was "bed ridden" for days due to exhaustion and rigors. Patient thought he was improving, however this week he had 2 episodes of severe rigors lasting 1.5 hours, followed by periods of severe diaphoresis. Patient regularly follows with Dr. Tubbs and last saw him on Monday 1/24 for his symptoms,  states he was told his WBC was "double" his baseline (baseline WBC 24). Patient was sent for outpatient imaging and got a CT Scan of abd/p earlier this AM. States he then got a call from Dr. Tubbs telling him to come to ED immediately for further evaluation. Patient denies any recent travel, sick contacts, abd pain, N/V/D, CP. Patient still c/o dry cough, exertional dyspnea, exhaustion, rigors, fevers, poor PO intake, myalgias. Denies eating any unusual or raw/undercooked meat.     In ED:  Vitals: T 100.7, , /62, RR 18, SpO2 97% on RA  Labs significant for: WBC 44.77, H/H 9.5/28.4, plt 462, neutrophil 18.8, lymphocyte 24.6, Na 132, K 3.1, BUN 25, Cr 1.30, albumin 1.9, alk phos 207, GFR 58  UA: spec gravity 1.005, protein 30, trace blood, 6-10 WBC, 3-5 RBC  CT chest/abd/p: Findings most suggestive of multiple abscesses in the right hepatic lobe with associated thrombosis of the right hepatic vein consistent with septic thrombophlebitis.   -Findings likely representing acute diverticulitis of the distal descending colon.  -Moderate right pleural effusion with associated segmental and subsegmental consolidations in the posterior right middle lobe and right lower lobe may represent atelectasis and/or pneumonia.  -Indeterminate 1 cm left adrenal nodule.  EKG: sinus tachy rate 121, possible LAE  Given: 650mg PO Tylenol x1, 500mg PO Flagyl x1, IV Zosyn x1, 2L NS bolus x1 (28 Jan 2022 17:21)      PAST MEDICAL & SURGICAL HISTORY:  CLL (chronic lymphocytic leukemia)    Diverticulitis    HTN (hypertension)    Enlarged prostate    GERD (gastroesophageal reflux disease)    S/P inguinal hernia repair  bilat    S/P reconstruction of ligament of knee        FAMILY HISTORY:  Family history of breast cancer (Mother)          Social History:    MEDICATIONS  (STANDING):  heparin  Infusion. 1600 Unit(s)/Hr (16 mL/Hr) IV Continuous <Continuous>  lactobacillus acidophilus 1 Tablet(s) Oral two times a day with meals  pantoprazole    Tablet 40 milliGRAM(s) Oral before breakfast  piperacillin/tazobactam IVPB.. 3.375 Gram(s) IV Intermittent every 8 hours  tamsulosin 0.4 milliGRAM(s) Oral at bedtime    MEDICATIONS  (PRN):  heparin   Injectable 6500 Unit(s) IV Push every 6 hours PRN For aPTT less than 40  heparin   Injectable 3000 Unit(s) IV Push every 6 hours PRN For aPTT between 40 - 57  ondansetron Injectable 4 milliGRAM(s) IV Push every 8 hours PRN Nausea and/or Vomiting        T(C): 36.4 (01-31-22 @ 05:35), Max: 36.8 (01-30-22 @ 14:15)  HR: 93 (01-31-22 @ 05:35) (93 - 104)  BP: 123/74 (01-31-22 @ 05:35) (110/70 - 123/74)  RR: 18 (01-31-22 @ 05:35) (18 - 18)  SpO2: 94% (01-31-22 @ 05:35) (94% - 94%)  Wt(kg): --    PHYSICAL EXAM:  GENERAL: NAD, well-groomed, well-developed  HEAD:  Atraumatic, Normocephalic  NECK: Supple, No JVD, Normal thyroid  CHEST/LUNG: Clear to percussion bilaterally; No rales, rhonchi, wheezing, or rubs  HEART: Regular rate and rhythm; No murmurs, rubs, or gallops  ABDOMEN: Soft, Nontender, Nondistended; Bowel sounds present  EXTREMITIES:  2+ Peripheral Pulses, No clubbing, cyanosis, or edema  SKIN: No rashes or lesions    CAPILLARY BLOOD GLUCOSE                                8.4    30.37 )-----------( 443      ( 30 Jan 2022 08:42 )             25.5       CMP:  01-30 @ 08:42  SGPT 22  Albumin 1.6   Alk Phos 144   Anion Gap 5   SGOT 17   Total Bili 0.4   BUN 16   Calcium Total 8.2   CO2 24   Chloride 109   Creatinine 1.20   eGFR if AA 74   eGFR if non AA 64   Glucose 115   Potassium 3.7   Protein 6.3   Sodium 138      Thyroid Function Tests:      Diabetes Tests:       Radiology:

## 2022-01-30 NOTE — CONSULT NOTE ADULT - CONSULT REASON
Strep septicemia with Liver abscesses
liver abscess
Liver abscess
Hepatic Abcess
sob  torre  weakness  chills  CLL  pleural eff
adrenal nodule

## 2022-01-30 NOTE — PROGRESS NOTE ADULT - SUBJECTIVE AND OBJECTIVE BOX
Date/Time Patient Seen:  		  Referring MD:   Data Reviewed	       Patient is a 66y old  Male who presents with a chief complaint of hepatic abscess (29 Jan 2022 17:44)      Subjective/HPI     PAST MEDICAL & SURGICAL HISTORY:  CLL (chronic lymphocytic leukemia)    Diverticulitis    HTN (hypertension)    Enlarged prostate    GERD (gastroesophageal reflux disease)    S/P inguinal hernia repair  bilat    S/P reconstruction of ligament of knee          Medication list         MEDICATIONS  (STANDING):  heparin  Infusion. 1600 Unit(s)/Hr (16 mL/Hr) IV Continuous <Continuous>  lactobacillus acidophilus 1 Tablet(s) Oral two times a day with meals  metroNIDAZOLE    Tablet 500 milliGRAM(s) Oral every 8 hours  pantoprazole    Tablet 40 milliGRAM(s) Oral before breakfast  piperacillin/tazobactam IVPB.. 3.375 Gram(s) IV Intermittent every 8 hours  tamsulosin 0.4 milliGRAM(s) Oral at bedtime    MEDICATIONS  (PRN):  heparin   Injectable 6500 Unit(s) IV Push every 6 hours PRN For aPTT less than 40  heparin   Injectable 3000 Unit(s) IV Push every 6 hours PRN For aPTT between 40 - 57  ondansetron Injectable 4 milliGRAM(s) IV Push every 8 hours PRN Nausea and/or Vomiting         Vitals log        ICU Vital Signs Last 24 Hrs  T(C): 37.2 (29 Jan 2022 20:46), Max: 37.7 (29 Jan 2022 13:58)  T(F): 98.9 (29 Jan 2022 20:46), Max: 99.8 (29 Jan 2022 13:58)  HR: 102 (29 Jan 2022 20:46) (102 - 105)  BP: 110/69 (29 Jan 2022 20:46) (101/56 - 110/69)  BP(mean): --  ABP: --  ABP(mean): --  RR: 18 (29 Jan 2022 20:46) (18 - 18)  SpO2: 92% (29 Jan 2022 20:46) (92% - 98%)           Input and Output:  I&O's Detail    28 Jan 2022 07:01  -  29 Jan 2022 07:00  --------------------------------------------------------  IN:    Heparin Infusion: 32 mL    Heparin Infusion: 152 mL    sodium chloride 0.9%: 720 mL  Total IN: 904 mL    OUT:  Total OUT: 0 mL    Total NET: 904 mL      29 Jan 2022 07:01  -  30 Jan 2022 05:06  --------------------------------------------------------  IN:    Heparin Infusion: 184 mL    sodium chloride 0.9%: 180 mL  Total IN: 364 mL    OUT:    Voided (mL): 400 mL  Total OUT: 400 mL    Total NET: -36 mL          Lab Data                        8.6    37.21 )-----------( 435      ( 29 Jan 2022 15:13 )             25.8     01-29    136  |  106  |  18  ----------------------------<  98  3.6   |  25  |  1.20    Ca    7.8<L>      29 Jan 2022 08:08  Phos  3.1     01-29  Mg     2.0     01-29    TPro  6.3  /  Alb  1.6<L>  /  TBili  0.5  /  DBili  x   /  AST  15  /  ALT  20  /  AlkPhos  158<H>  01-29            Review of Systems	      Objective     Physical Examination  heart s1s2  lung dec BS  abd soft        Pertinent Lab findings & Imaging      Francoise:  NO   Adequate UO     I&O's Detail    28 Jan 2022 07:01  -  29 Jan 2022 07:00  --------------------------------------------------------  IN:    Heparin Infusion: 32 mL    Heparin Infusion: 152 mL    sodium chloride 0.9%: 720 mL  Total IN: 904 mL    OUT:  Total OUT: 0 mL    Total NET: 904 mL      29 Jan 2022 07:01  -  30 Jan 2022 05:06  --------------------------------------------------------  IN:    Heparin Infusion: 184 mL    sodium chloride 0.9%: 180 mL  Total IN: 364 mL    OUT:    Voided (mL): 400 mL  Total OUT: 400 mL    Total NET: -36 mL               Discussed with:     Cultures:	        Radiology

## 2022-01-30 NOTE — PROGRESS NOTE ADULT - SUBJECTIVE AND OBJECTIVE BOX
pt seen  doing well  no pain  ICU Vital Signs Last 24 Hrs  T(C): 37.2 (29 Jan 2022 20:46), Max: 37.7 (29 Jan 2022 13:58)  T(F): 98.9 (29 Jan 2022 20:46), Max: 99.8 (29 Jan 2022 13:58)  HR: 102 (29 Jan 2022 20:46) (102 - 102)  BP: 110/69 (29 Jan 2022 20:46) (104/62 - 110/69)  BP(mean): --  ABP: --  ABP(mean): --  RR: 18 (29 Jan 2022 20:46) (18 - 18)  SpO2: 92% (29 Jan 2022 20:46) (92% - 95%)  gen-NAD  resp-clear  abd-soft NT/ND                          8.4    30.37 )-----------( 443      ( 30 Jan 2022 08:42 )             25.5

## 2022-01-30 NOTE — PROVIDER CONTACT NOTE (CRITICAL VALUE NOTIFICATION) - SITUATION
BC positive growth in aerobic bottle gram positive cocci in pairs and chains
BC positive growth in anaerobic bottle positive cocci in pairs and chairs

## 2022-01-30 NOTE — PROVIDER CONTACT NOTE (CRITICAL VALUE NOTIFICATION) - ACTION/TREATMENT ORDERED:
Dr. Bermudez aware. will continue antibiotic regime. will continue to assess and monitor Dr. Bermudez aware. Dr. Tubbs infectious disease aware.  will continue antibiotic regime. will continue to assess and monitor

## 2022-01-31 ENCOUNTER — TRANSCRIPTION ENCOUNTER (OUTPATIENT)
Age: 67
End: 2022-01-31

## 2022-01-31 LAB
ANION GAP SERPL CALC-SCNC: 6 MMOL/L — SIGNIFICANT CHANGE UP (ref 5–17)
APTT BLD: 28.9 SEC — SIGNIFICANT CHANGE UP (ref 27.5–35.5)
BUN SERPL-MCNC: 14 MG/DL — SIGNIFICANT CHANGE UP (ref 7–23)
CALCIUM SERPL-MCNC: 8.4 MG/DL — LOW (ref 8.5–10.1)
CHLORIDE SERPL-SCNC: 107 MMOL/L — SIGNIFICANT CHANGE UP (ref 96–108)
CO2 SERPL-SCNC: 24 MMOL/L — SIGNIFICANT CHANGE UP (ref 22–31)
CREAT SERPL-MCNC: 1.1 MG/DL — SIGNIFICANT CHANGE UP (ref 0.5–1.3)
GLUCOSE SERPL-MCNC: 102 MG/DL — HIGH (ref 70–99)
HCT VFR BLD CALC: 28.3 % — LOW (ref 39–50)
HGB BLD-MCNC: 9.3 G/DL — LOW (ref 13–17)
INR BLD: 1.3 RATIO — HIGH (ref 0.88–1.16)
MCHC RBC-ENTMCNC: 29.9 PG — SIGNIFICANT CHANGE UP (ref 27–34)
MCHC RBC-ENTMCNC: 32.9 GM/DL — SIGNIFICANT CHANGE UP (ref 32–36)
MCV RBC AUTO: 91 FL — SIGNIFICANT CHANGE UP (ref 80–100)
NRBC # BLD: 0 /100 WBCS — SIGNIFICANT CHANGE UP (ref 0–0)
PLATELET # BLD AUTO: 512 K/UL — HIGH (ref 150–400)
POTASSIUM SERPL-MCNC: 3.5 MMOL/L — SIGNIFICANT CHANGE UP (ref 3.5–5.3)
POTASSIUM SERPL-SCNC: 3.5 MMOL/L — SIGNIFICANT CHANGE UP (ref 3.5–5.3)
PROTHROM AB SERPL-ACNC: 15 SEC — HIGH (ref 10.6–13.6)
RBC # BLD: 3.11 M/UL — LOW (ref 4.2–5.8)
RBC # FLD: 13.9 % — SIGNIFICANT CHANGE UP (ref 10.3–14.5)
SODIUM SERPL-SCNC: 137 MMOL/L — SIGNIFICANT CHANGE UP (ref 135–145)
WBC # BLD: 29.22 K/UL — HIGH (ref 3.8–10.5)
WBC # FLD AUTO: 29.22 K/UL — HIGH (ref 3.8–10.5)

## 2022-01-31 PROCEDURE — 99233 SBSQ HOSP IP/OBS HIGH 50: CPT

## 2022-01-31 PROCEDURE — 99232 SBSQ HOSP IP/OBS MODERATE 35: CPT

## 2022-01-31 PROCEDURE — 74178 CT ABD&PLV WO CNTR FLWD CNTR: CPT | Mod: 26

## 2022-01-31 RX ORDER — BACITRACIN ZINC 500 UNIT/G
1 OINTMENT IN PACKET (EA) TOPICAL DAILY
Refills: 0 | Status: DISCONTINUED | OUTPATIENT
Start: 2022-01-31 | End: 2022-02-02

## 2022-01-31 RX ORDER — POTASSIUM CHLORIDE 20 MEQ
40 PACKET (EA) ORAL ONCE
Refills: 0 | Status: COMPLETED | OUTPATIENT
Start: 2022-01-31 | End: 2022-01-31

## 2022-01-31 RX ORDER — APIXABAN 2.5 MG/1
5 TABLET, FILM COATED ORAL EVERY 12 HOURS
Refills: 0 | Status: DISCONTINUED | OUTPATIENT
Start: 2022-01-31 | End: 2022-02-02

## 2022-01-31 RX ADMIN — PANTOPRAZOLE SODIUM 40 MILLIGRAM(S): 20 TABLET, DELAYED RELEASE ORAL at 05:38

## 2022-01-31 RX ADMIN — PIPERACILLIN AND TAZOBACTAM 25 GRAM(S): 4; .5 INJECTION, POWDER, LYOPHILIZED, FOR SOLUTION INTRAVENOUS at 21:36

## 2022-01-31 RX ADMIN — Medication 1 TABLET(S): at 07:16

## 2022-01-31 RX ADMIN — HEPARIN SODIUM 1800 UNIT(S)/HR: 5000 INJECTION INTRAVENOUS; SUBCUTANEOUS at 07:12

## 2022-01-31 RX ADMIN — TAMSULOSIN HYDROCHLORIDE 0.4 MILLIGRAM(S): 0.4 CAPSULE ORAL at 21:36

## 2022-01-31 RX ADMIN — APIXABAN 5 MILLIGRAM(S): 2.5 TABLET, FILM COATED ORAL at 17:57

## 2022-01-31 RX ADMIN — Medication 40 MILLIEQUIVALENT(S): at 10:34

## 2022-01-31 RX ADMIN — Medication 1 TABLET(S): at 17:57

## 2022-01-31 RX ADMIN — PIPERACILLIN AND TAZOBACTAM 25 GRAM(S): 4; .5 INJECTION, POWDER, LYOPHILIZED, FOR SOLUTION INTRAVENOUS at 13:27

## 2022-01-31 RX ADMIN — Medication 1 APPLICATION(S): at 13:27

## 2022-01-31 RX ADMIN — PIPERACILLIN AND TAZOBACTAM 25 GRAM(S): 4; .5 INJECTION, POWDER, LYOPHILIZED, FOR SOLUTION INTRAVENOUS at 05:39

## 2022-01-31 RX ADMIN — HEPARIN SODIUM 1800 UNIT(S)/HR: 5000 INJECTION INTRAVENOUS; SUBCUTANEOUS at 10:30

## 2022-01-31 RX ADMIN — HEPARIN SODIUM 1800 UNIT(S)/HR: 5000 INJECTION INTRAVENOUS; SUBCUTANEOUS at 03:22

## 2022-01-31 NOTE — PROGRESS NOTE ADULT - ASSESSMENT
65 yo male with pmhx of CLL diverticulitis GERD and HTN sent in by Dr. Tubbs for liver abscess seen on out pt CT.    intraabdominal abscess  thrombosis of the right hepatic vein consistent with septic thrombophlebitis  CLL  hx of Diverticular Disease of Intestine  Pleural eff - Atelectasis  Adrenal nodule    MRI noted - confirmed CT findings - on ABX - on Hep Ac -   HEME onc eval noted  on RA  NPO - planned for Procedure -   Blood cx noted - positive    I darryl  DVT p = ac = for thrombosis  emp ABX - ID eval - cx and biomarkers -   will need IR drain and sampling of fluid - gram stain and cx  will consider - discuss with IR - Pleural eff drainage - likely reactive from the process of Abscess - Hepatic -   CLL management - Onc following  CT abd chest reviewed from outpatient  monitor VS and HD and Sat - keep sat > 88 pct  Surgery Eval noted  cvs rx regimen and BP control  Replete Lytes -

## 2022-01-31 NOTE — PROGRESS NOTE ADULT - PROBLEM SELECTOR PLAN 3
Outpatient CT scan showing moderate right pleural effusion with associated segmental and subsegmental consolidations in the posterior right middle lobe and right lower lobe may represent atelectasis and/or pneumonia  -Likely reactive in setting of hepatic abscess, however may need thoracentesis with IR and fluid analysis  -Continue IV Zosyn for possible PNA  -F/u blood cultures  -ID Dr. Brand consulted  -Pulm Dr. Hartley consulted, recs appreciated Outpatient CT scan showing moderate right pleural effusion with associated segmental and subsegmental consolidations in the posterior right middle lobe and right lower lobe may represent atelectasis and/or pneumonia  - Likely reactive in setting of hepatic abscess, however may need thoracentesis with IR and fluid analysis  - On IV Zosyn which will cover for possible PNA   - F/u repeat blood cultures  - ID Dr. Brand following   - Pulm Dr. Hartley following

## 2022-01-31 NOTE — PROGRESS NOTE ADULT - PROBLEM SELECTOR PLAN 7
MRCP noted for 1cm adrenal nodule on left   Endocrine Dr. Perlman to see patient MRCP noted for 1cm adrenal nodule on left   - Endocrine Dr. Perlman consulted: recs to repeat adrenal imaging with CT washout protocol functional workup in outpt setting. MRCP noted for 1cm adrenal nodule on left   - Endocrine Dr. Perlman consulted: recs to repeat adrenal imaging with CT washout protocol functional workup in outpt setting.  - f/u results

## 2022-01-31 NOTE — PROGRESS NOTE ADULT - PROBLEM SELECTOR PLAN 2
Outpatient CT scan reports showing findings most suggestive of multiple abscesses in the right hepatic lobe with associated thrombosis of the right hepatic vein consistent with septic thrombophlebitis  -Septic workup as above  -Continue Heparin gtt per GI Outpatient CT scan reports showing findings most suggestive of multiple abscesses in the right hepatic lobe with associated thrombosis of the right hepatic vein consistent with septic thrombophlebitis  - Septic workup as above  - Continue Heparin gtt per GI; hold for procedures Outpatient CT scan reports showing findings most suggestive of multiple abscesses in the right hepatic lobe with associated thrombosis of the right hepatic vein consistent with septic thrombophlebitis  - Septic workup as above  - Heparin drip -->  Eliquis; since no plan for any surgical intervention or IR procedure. Hematology recommended Eliquis 5mg BID x 3 months.

## 2022-01-31 NOTE — PROGRESS NOTE ADULT - PROBLEM SELECTOR PLAN 1
-Outpatient CT scan reports showing findings most suggestive of multiple abscesses in the right hepatic lobe with associated thrombosis of the right hepatic vein consistent with septic thrombophlebitis  -Continue IV Zosyn, Po flagyl   - GI ok to start diet. d/c fluids   -F/u blood cultures, UA nonspecific, f/u urine cultures  - MRCP results noted   -No acute surgical intervention at this time per Gen Surg  - Will probably  need IR drainage pending response and clinical course  -GI Dr. Acosta consulted  -ID Dr. Kayce Zhao  -Surg Dr. Carey Outpatient CT scan reports showing findings most suggestive of multiple abscesses in the right hepatic lobe with associated thrombosis of the right hepatic vein consistent with septic thrombophlebitis  - Positive blood cx strep anginosus; negative urine cx  - Continue IV Zosyn, Po flagyl   - f/u repeat blood cx drawn 1/31   - GI ok to continue diet. NPO for procedures.  - MRCP results noted   - No acute surgical intervention at this time per Gen Surg  - IR drainage today 1/31 for liver abscess   - GI Dr. Acosta following   - ID Dr. Kayce Zhao following   - Surg Dr. Carey following Outpatient CT scan reports showing findings most suggestive of multiple abscesses in the right hepatic lobe with associated thrombosis of the right hepatic vein consistent with septic thrombophlebitis  - Positive blood cx strep anginosus; negative urine cx  - Continue IV Zosyn, Po flagyl   - f/u repeat blood cx drawn 1/31   - GI ok to continue diet. NPO for procedures.  - MRCP results noted   - No acute surgical intervention at this time per Gen Surg  - IR drainage today 1/31 for liver abscess: Plan d/w Surgery Dr. Carey and GI, no plan for IR drainage of the abscess, since high risk of complications due to location. Will continue antibiotics and transition from heparin drip to Eliquis at this time since no plan for any surgical intervention or IR procedure. (Hematology recommended Eliquis 5mg BID x 3 months)     - GI Dr. Acosta following   - ID Dr. Kayce Zhao following   - Surg Dr. Carey following Outpatient CT scan reports showing findings most suggestive of multiple abscesses in the right hepatic lobe with associated thrombosis of the right hepatic vein consistent with septic thrombophlebitis  - Positive blood cx strep anginosus; negative urine cx  - Continue IV Zosyn  - f/u repeat blood cx drawn 1/31   - GI ok to continue diet. NPO for procedures.  - MRCP results noted   - No acute surgical intervention at this time per Gen Surg  - IR drainage today 1/31 for liver abscess: Plan d/w Surgery Dr. Carey and GI, no plan for IR drainage of the abscess, since high risk of complications due to location. Will continue antibiotics and transition from heparin drip to Eliquis at this time since no plan for any surgical intervention or IR procedure. (Hematology recommended Eliquis 5mg BID x 3 months)     - GI Dr. Acosta following   - ID Dr. Kayce Zhao following   - Surg Dr. Carey following

## 2022-01-31 NOTE — PROGRESS NOTE ADULT - ASSESSMENT
[ASSESSMENT and  PLAN]  67 yo male well known to Dr. Tubbs in our group with known CLL with ZAP70 negative, CD 38 negative, IgVH mutated, FISH -del(13q)  baseline WBC 23 to 25, Hgb 14, Plt 370    Admitted with  acute diverticulitis, multiple hepatic abscess and R hepatic vein thrombosis likely due to infection. Possible septic thrombophlebitis    RECOMMENDATIONS  - started IV antibiotics with clinical improvment  - was planned for IR drainage of liver abscess but due to location and bleeding risk given need for anticoagulation, IR has cancelled the case  - plan for repeat imaging with CT for monitoring of live abscesses.   - to continue IV heparin with goal PTT 60-90 while inpatient and potentially in need for invasive procedures  - when stable for discharge, can transition to Eliquis 5mg BID with plan for 3 months of anticoagulation given provoked thrombotic events in setting of liver inflammation/infection  - patient to follow up with Dr. Tubbs in office upon discharge

## 2022-01-31 NOTE — PROGRESS NOTE ADULT - PROBLEM SELECTOR PLAN 6
GFR 58 on admission, GFR 60-70 since June 2019 per chart review, likely CKD stage 2  -No signs of SABRINA, Cr 1.3 on admission, Cr 1.2 in Oct 202  -Monitor renal indices closely GFR 58 on admission, GFR 60-70 since June 2019 per chart review, likely CKD stage 2  - No signs of SABRINA, Cr 1.3 on admission, Cr 1.2 in Oct 202  - Monitor renal indices closely, Cr WNL and stable

## 2022-01-31 NOTE — PROGRESS NOTE ADULT - PROBLEM SELECTOR PLAN 8
Diagnosed in 2017, not undergoing active treatment  -Follows outpatient with H/O Dr. Tubbs  -Baseline WBC 24 Diagnosed in 2017, not undergoing active treatment  - Follows outpatient with H/O Dr. Tubbs  - Baseline WBC 24  - elevated in the setting of bacteremia and acute processes; downtrending  - continue to monitor WBC

## 2022-01-31 NOTE — PROGRESS NOTE ADULT - PROBLEM SELECTOR PLAN 12
DVT PPx: Start Heparin gtt given splenic vein thrombosis DVT PPx: Start Heparin gtt given splenic vein thrombosis  - hold for procedures - Heparin drip -->  Eliquis; since no plan for any surgical intervention or IR procedure. Hematology recommended Eliquis 5mg BID x 3 months.

## 2022-01-31 NOTE — PROGRESS NOTE ADULT - SUBJECTIVE AND OBJECTIVE BOX
Date/Time Patient Seen:  		  Referring MD:   Data Reviewed	       Patient is a 66y old  Male who presents with a chief complaint of hepatic abscess (30 Jan 2022 12:40)      Subjective/HPI     PAST MEDICAL & SURGICAL HISTORY:  CLL (chronic lymphocytic leukemia)    Diverticulitis    HTN (hypertension)    Enlarged prostate    GERD (gastroesophageal reflux disease)    S/P inguinal hernia repair  bilat    S/P reconstruction of ligament of knee          Medication list         MEDICATIONS  (STANDING):  heparin  Infusion. 1600 Unit(s)/Hr (16 mL/Hr) IV Continuous <Continuous>  lactobacillus acidophilus 1 Tablet(s) Oral two times a day with meals  pantoprazole    Tablet 40 milliGRAM(s) Oral before breakfast  piperacillin/tazobactam IVPB.. 3.375 Gram(s) IV Intermittent every 8 hours  tamsulosin 0.4 milliGRAM(s) Oral at bedtime    MEDICATIONS  (PRN):  heparin   Injectable 6500 Unit(s) IV Push every 6 hours PRN For aPTT less than 40  heparin   Injectable 3000 Unit(s) IV Push every 6 hours PRN For aPTT between 40 - 57  ondansetron Injectable 4 milliGRAM(s) IV Push every 8 hours PRN Nausea and/or Vomiting         Vitals log        ICU Vital Signs Last 24 Hrs  T(C): 36.8 (30 Jan 2022 14:15), Max: 36.8 (30 Jan 2022 14:15)  T(F): 98.3 (30 Jan 2022 14:15), Max: 98.3 (30 Jan 2022 14:15)  HR: 104 (30 Jan 2022 14:15) (104 - 104)  BP: 110/70 (30 Jan 2022 14:15) (110/70 - 110/70)  BP(mean): --  ABP: --  ABP(mean): --  RR: 18 (30 Jan 2022 14:15) (18 - 18)  SpO2: 94% (30 Jan 2022 14:15) (94% - 94%)           Input and Output:  I&O's Detail    29 Jan 2022 07:01  -  30 Jan 2022 07:00  --------------------------------------------------------  IN:    Heparin Infusion: 202 mL    sodium chloride 0.9%: 180 mL  Total IN: 382 mL    OUT:    Voided (mL): 400 mL  Total OUT: 400 mL    Total NET: -18 mL      30 Jan 2022 07:01  -  31 Jan 2022 05:20  --------------------------------------------------------  IN:    Heparin Infusion: 216 mL  Total IN: 216 mL    OUT:  Total OUT: 0 mL    Total NET: 216 mL          Lab Data                        8.4    30.37 )-----------( 443      ( 30 Jan 2022 08:42 )             25.5     01-30    138  |  109<H>  |  16  ----------------------------<  115<H>  3.7   |  24  |  1.20    Ca    8.2<L>      30 Jan 2022 08:42  Phos  3.1     01-29  Mg     2.0     01-29    TPro  6.3  /  Alb  1.6<L>  /  TBili  0.4  /  DBili  x   /  AST  17  /  ALT  22  /  AlkPhos  144<H>  01-30            Review of Systems	      Objective     Physical Examination    heart s1s2  lung dec BS  abd soft      Pertinent Lab findings & Imaging      Francoise:  NO   Adequate UO     I&O's Detail    29 Jan 2022 07:01  -  30 Jan 2022 07:00  --------------------------------------------------------  IN:    Heparin Infusion: 202 mL    sodium chloride 0.9%: 180 mL  Total IN: 382 mL    OUT:    Voided (mL): 400 mL  Total OUT: 400 mL    Total NET: -18 mL      30 Jan 2022 07:01  -  31 Jan 2022 05:20  --------------------------------------------------------  IN:    Heparin Infusion: 216 mL  Total IN: 216 mL    OUT:  Total OUT: 0 mL    Total NET: 216 mL               Discussed with:     Cultures:	        Radiology

## 2022-01-31 NOTE — DISCHARGE NOTE PROVIDER - NSDCMRMEDTOKEN_GEN_ALL_CORE_FT
Hyzaar 100 mg-25 mg oral tablet: 1 tab(s) orally once a day  PriLOSEC OTC 20 mg oral delayed release tablet: 1 tab(s) orally once a day  tamsulosin 0.4 mg oral capsule: 1 cap(s) orally once a day   apixaban 5 mg oral tablet: 1 tab(s) orally every 12 hours  Hyzaar 100 mg-25 mg oral tablet: 1 tab(s) orally once a day  levoFLOXacin 750 mg oral tablet: 1 tab(s) orally every 24 hours  metroNIDAZOLE 500 mg oral tablet: 1 tab(s) orally every 8 hours  PriLOSEC OTC 20 mg oral delayed release tablet: 1 tab(s) orally once a day  tamsulosin 0.4 mg oral capsule: 1 cap(s) orally once a day

## 2022-01-31 NOTE — DISCHARGE NOTE PROVIDER - HOSPITAL COURSE
ADMISSION DATE:  01-28-22    ---  FROM ADMISSION H+P:   HPI:  65 yo male PMH CLL (not undergoing treatment), HTN, BPH presents to ED after outpatient CT scan showed multiple hepatic abscess, sent by H/O Dr. Tubbs. Patient states for past 1-2 months he has had intermittent weakness, body aches, chills, fatigue, exertional dyspnea. Patient states over [place] he was "bed ridden" for days due to exhaustion and rigors. Patient thought he was improving, however this week he had 2 episodes of severe rigors lasting 1.5 hours, followed by periods of severe diaphoresis. Patient regularly follows with Dr. Tubbs and last saw him on Monday 1/24 for his symptoms,  states he was told his WBC was "double" his baseline (baseline WBC 24). Patient was sent for outpatient imaging and got a CT Scan of abd/p earlier this AM. States he then got a call from Dr. Tubbs telling him to come to ED immediately for further evaluation. Patient denies any recent travel, sick contacts, abd pain, N/V/D, CP. Patient still c/o dry cough, exertional dyspnea, exhaustion, rigors, fevers, poor PO intake, myalgias. Denies eating any unusual or raw/undercooked meat.     In ED:  Vitals: T 100.7, , /62, RR 18, SpO2 97% on RA  Labs significant for: WBC 44.77, H/H 9.5/28.4, plt 462, neutrophil 18.8, lymphocyte 24.6, Na 132, K 3.1, BUN 25, Cr 1.30, albumin 1.9, alk phos 207, GFR 58  UA: spec gravity 1.005, protein 30, trace blood, 6-10 WBC, 3-5 RBC  CT chest/abd/p: Findings most suggestive of multiple abscesses in the right hepatic lobe with associated thrombosis of the right hepatic vein consistent with septic thrombophlebitis.   -Findings likely representing acute diverticulitis of the distal descending colon.  -Moderate right pleural effusion with associated segmental and subsegmental consolidations in the posterior right middle lobe and right lower lobe may represent atelectasis and/or pneumonia.  -Indeterminate 1 cm left adrenal nodule.  EKG: sinus tachy rate 121, possible LAE  Given: 650mg PO Tylenol x1, 500mg PO Flagyl x1, IV Zosyn x1, 2L NS bolus x1 (28 Jan 2022 17:21)      ---  HOSPITAL COURSE/PERTINENT LABS/PROCEDURES PERFORMED/PENDING TESTS:   Pt was admitted for       Patient is stable for discharge as per primary medical team and consultants.    PT consulted, recommends discharge ______    Patient showed improvement throughout hospitalization. Patient was seen and examined on day of discharge. Patient was medically optimized for discharge with close outpatient follow up.    ---  PATIENT CONDITION:  - stable    --  VITALS:   T(C): 36.2 (01-31-22 @ 13:19), Max: 36.4 (01-31-22 @ 05:35)  HR: 114 (01-31-22 @ 13:19) (93 - 114)  BP: 132/83 (01-31-22 @ 13:19) (123/74 - 132/83)  RR: 18 (01-31-22 @ 13:19) (18 - 18)  SpO2: 97% (01-31-22 @ 13:19) (94% - 97%)    PHYSICAL EXAM ON DAY OF DISCHARGE:    ---  CONSULTANTS:   -    ---  ADVANCED CARE PLANNING:  - Code status:      - MOLST completed:      [  ] NO     [  ] YES    ---  TIME SPENT:  I, the attending physician, was physically present for the key portions of the evaluation and management (E/M) service provided. The total amount of time spent reviewing the hospital notes, laboratory values, imaging findings, assessing/counseling the patient, discussing with consultant physicians, social work, nursing staff was -- minutes       ADMISSION DATE:  01-28-22    ---  FROM ADMISSION H+P:   HPI:  65 yo male PMH CLL (not undergoing treatment), HTN, BPH presents to ED after outpatient CT scan showed multiple hepatic abscess, sent by H/O Dr. Tubbs. Patient states for past 1-2 months he has had intermittent weakness, body aches, chills, fatigue, exertional dyspnea. Patient states over [place] he was "bed ridden" for days due to exhaustion and rigors. Patient thought he was improving, however this week he had 2 episodes of severe rigors lasting 1.5 hours, followed by periods of severe diaphoresis. Patient regularly follows with Dr. Tubbs and last saw him on Monday 1/24 for his symptoms,  states he was told his WBC was "double" his baseline (baseline WBC 24). Patient was sent for outpatient imaging and got a CT Scan of abd/p earlier this AM. States he then got a call from Dr. Tubbs telling him to come to ED immediately for further evaluation. Patient denies any recent travel, sick contacts, abd pain, N/V/D, CP. Patient still c/o dry cough, exertional dyspnea, exhaustion, rigors, fevers, poor PO intake, myalgias. Denies eating any unusual or raw/undercooked meat.     In ED:  Vitals: T 100.7, , /62, RR 18, SpO2 97% on RA  Labs significant for: WBC 44.77, H/H 9.5/28.4, plt 462, neutrophil 18.8, lymphocyte 24.6, Na 132, K 3.1, BUN 25, Cr 1.30, albumin 1.9, alk phos 207, GFR 58  UA: spec gravity 1.005, protein 30, trace blood, 6-10 WBC, 3-5 RBC  CT chest/abd/p: Findings most suggestive of multiple abscesses in the right hepatic lobe with associated thrombosis of the right hepatic vein consistent with septic thrombophlebitis.   -Findings likely representing acute diverticulitis of the distal descending colon.  -Moderate right pleural effusion with associated segmental and subsegmental consolidations in the posterior right middle lobe and right lower lobe may represent atelectasis and/or pneumonia.  -Indeterminate 1 cm left adrenal nodule.  EKG: sinus tachy rate 121, possible LAE  Given: 650mg PO Tylenol x1, 500mg PO Flagyl x1, IV Zosyn x1, 2L NS bolus x1 (28 Jan 2022 17:21)      ---  HOSPITAL COURSE/PERTINENT LABS/PROCEDURES PERFORMED/PENDING TESTS:   Pt was admitted for sepsis 2/2 hepatic abscesses and septic thrombophlebitis. Pt was placed on a Heparin drip. GI Dr. Acosta and Surgery Dr. Carey were consulted. Pt was to have IR drainage of abscess on 1/31 but due but due to location and bleeding risk, procedure was not done. Heme/onc Dr. Duque was consulted and pt was restarted on AC- Eliquis 5 mg BID. Pt was also found to have a moderate right pleural effusion  with associated segmental and subsegmental consolidations in the posterior right middle lobe and right lower lobe may represent atelectasis and/or pneumonia, ID Dr. Tubbs was consulted and pt was placed on Zosyn for possible PNA. BCx grew Strep anginosus, repeat BCX -----. MRCP noted for 1cm adrenal nodule on left and Endocrine Dr. Perlman was consulted. Adrenal imaging with CT washout protocol was done which revealed --- setting.      Patient is stable for discharge as per primary medical team and consultants.    PT consulted, recommends discharge ______    Patient showed improvement throughout hospitalization. Patient was seen and examined on day of discharge. Patient was medically optimized for discharge with close outpatient follow up.    ---  PATIENT CONDITION:  - stable    --  VITALS:   T(C): 36.2 (01-31-22 @ 13:19), Max: 36.4 (01-31-22 @ 05:35)  HR: 114 (01-31-22 @ 13:19) (93 - 114)  BP: 132/83 (01-31-22 @ 13:19) (123/74 - 132/83)  RR: 18 (01-31-22 @ 13:19) (18 - 18)  SpO2: 97% (01-31-22 @ 13:19) (94% - 97%)    PHYSICAL EXAM ON DAY OF DISCHARGE:    ---  CONSULTANTS:   - GI: Dr. Acosta  - Surgery: Dr. Carey  - ID: Dr. Tubbs  - Pulm: Dr. Hartley   - Heme/onc: Dr. Duque   - Endo: Dr. Perlman     ---  ADVANCED CARE PLANNING:  - Code status:      - MOLST completed:      [  ] NO     [  ] YES    ---  TIME SPENT:  I, the attending physician, was physically present for the key portions of the evaluation and management (E/M) service provided. The total amount of time spent reviewing the hospital notes, laboratory values, imaging findings, assessing/counseling the patient, discussing with consultant physicians, social work, nursing staff was -- minutes       ADMISSION DATE:  01-28-22    ---  FROM ADMISSION H+P:   HPI:  65 yo male PMH CLL (not undergoing treatment), HTN, BPH presents to ED after outpatient CT scan showed multiple hepatic abscess, sent by H/O Dr. Tubbs. Patient states for past 1-2 months he has had intermittent weakness, body aches, chills, fatigue, exertional dyspnea. Patient states over [place] he was "bed ridden" for days due to exhaustion and rigors. Patient thought he was improving, however this week he had 2 episodes of severe rigors lasting 1.5 hours, followed by periods of severe diaphoresis. Patient regularly follows with Dr. Tubbs and last saw him on Monday 1/24 for his symptoms,  states he was told his WBC was "double" his baseline (baseline WBC 24). Patient was sent for outpatient imaging and got a CT Scan of abd/p earlier this AM. States he then got a call from Dr. Tubbs telling him to come to ED immediately for further evaluation. Patient denies any recent travel, sick contacts, abd pain, N/V/D, CP. Patient still c/o dry cough, exertional dyspnea, exhaustion, rigors, fevers, poor PO intake, myalgias. Denies eating any unusual or raw/undercooked meat.     In ED:  Vitals: T 100.7, , /62, RR 18, SpO2 97% on RA  Labs significant for: WBC 44.77, H/H 9.5/28.4, plt 462, neutrophil 18.8, lymphocyte 24.6, Na 132, K 3.1, BUN 25, Cr 1.30, albumin 1.9, alk phos 207, GFR 58  UA: spec gravity 1.005, protein 30, trace blood, 6-10 WBC, 3-5 RBC  CT chest/abd/p: Findings most suggestive of multiple abscesses in the right hepatic lobe with associated thrombosis of the right hepatic vein consistent with septic thrombophlebitis.   -Findings likely representing acute diverticulitis of the distal descending colon.  -Moderate right pleural effusion with associated segmental and subsegmental consolidations in the posterior right middle lobe and right lower lobe may represent atelectasis and/or pneumonia.  -Indeterminate 1 cm left adrenal nodule.  EKG: sinus tachy rate 121, possible LAE  Given: 650mg PO Tylenol x1, 500mg PO Flagyl x1, IV Zosyn x1, 2L NS bolus x1 (28 Jan 2022 17:21)      ---  HOSPITAL COURSE/PERTINENT LABS/PROCEDURES PERFORMED/PENDING TESTS:   Pt was admitted for sepsis 2/2 hepatic abscesses and septic thrombophlebitis. Pt was placed on a Heparin drip. GI Dr. Acosta and Surgery Dr. Carey were consulted. Pt was to have IR drainage of abscess on 1/31 but due but due to location and bleeding risk, procedure was not done. Heme/onc Dr. Duque was consulted and pt was restarted on AC - Eliquis 5 mg BID. Pt was also found to have a moderate right pleural effusion  with associated segmental and subsegmental consolidations in the posterior right middle lobe and right lower lobe may represent atelectasis and/or pneumonia, ID Dr. Tubbs was consulted and pt was placed on Zosyn for possible PNA. BCx grew Strep intermedius. Abx was changed to Levofloxacin 750mg PO QD and Metronidazole 500mg PO Q8h due to sensitivities. Pt to be treated for at least 4 weeks from negative blood cultures with follow up CTAP to reassess abscess before discontinuing antibiotics.  Repeat BCX -----. MRCP noted for 1cm adrenal nodule on left and Endocrine Dr. Perlman was consulted. Adrenal imaging with CT washout protocol was done which revealed 13 mm left adrenal nodule compatible with either a complex cyst or small hematoma. Multifocal liver abscesses are mildly decreased in size. Sigmoid diverticulosis and mild pericolic inflammation suggestive of acute diverticulitis. Pt to follow up with endo outpatient.       UPDATED 2/1     Patient is stable for discharge as per primary medical team and consultants.      Patient showed improvement throughout hospitalization. Patient was seen and examined on day of discharge. Patient was medically optimized for discharge with close outpatient follow up.    ---  PATIENT CONDITION:  - stable    --  Vital Signs Last 24 Hrs  T(C): 36.6 (01 Feb 2022 04:40), Max: 36.6 (31 Jan 2022 19:58)  T(F): 97.8 (01 Feb 2022 04:40), Max: 97.8 (31 Jan 2022 19:58)  HR: 92 (01 Feb 2022 04:40) (92 - 114)  BP: 122/75 (01 Feb 2022 04:40) (122/75 - 134/76)  BP(mean): --  RR: 18 (01 Feb 2022 04:40) (18 - 18)  SpO2: 93% (01 Feb 2022 04:40) (93% - 97%)    PHYSICAL EXAM ON DAY OF DISCHARGE:  GENERAL: NAD  HEENT:  anicteric, moist mucous membranes  CHEST/LUNG:  CTA b/l, no rales, wheezes, or rhonchi  HEART:  RRR, S1, S2  ABDOMEN:  BS+, soft, nontender, nondistended  EXTREMITIES: no edema, cyanosis, or calf tenderness  NERVOUS SYSTEM: answers questions and follows commands appropriately    ---  CONSULTANTS:   - GI: Dr. Acosta  - Surgery: Dr. Carey  - ID: Dr. Tubbs  - Pulm: Dr. Hartley   - Heme/onc: Dr. Duque   - Endo: Dr. Perlman     ---  ADVANCED CARE PLANNING:  - Code status:      - MOLST completed:      [  ] NO     [  ] YES    ---  TIME SPENT:  I, the attending physician, was physically present for the key portions of the evaluation and management (E/M) service provided. The total amount of time spent reviewing the hospital notes, laboratory values, imaging findings, assessing/counseling the patient, discussing with consultant physicians, social work, nursing staff was -- minutes       ADMISSION DATE:  01-28-22    ---  FROM ADMISSION H+P:   HPI:  67 yo male PMH CLL (not undergoing treatment), HTN, BPH presents to ED after outpatient CT scan showed multiple hepatic abscess, sent by H/O Dr. Tubbs. Patient states for past 1-2 months he has had intermittent weakness, body aches, chills, fatigue, exertional dyspnea. Patient states over [place] he was "bed ridden" for days due to exhaustion and rigors. Patient thought he was improving, however this week he had 2 episodes of severe rigors lasting 1.5 hours, followed by periods of severe diaphoresis. Patient regularly follows with Dr. Tubbs and last saw him on Monday 1/24 for his symptoms,  states he was told his WBC was "double" his baseline (baseline WBC 24). Patient was sent for outpatient imaging and got a CT Scan of abd/p earlier this AM. States he then got a call from Dr. Tubbs telling him to come to ED immediately for further evaluation. Patient denies any recent travel, sick contacts, abd pain, N/V/D, CP. Patient still c/o dry cough, exertional dyspnea, exhaustion, rigors, fevers, poor PO intake, myalgias. Denies eating any unusual or raw/undercooked meat.     In ED:  Vitals: T 100.7, , /62, RR 18, SpO2 97% on RA  Labs significant for: WBC 44.77, H/H 9.5/28.4, plt 462, neutrophil 18.8, lymphocyte 24.6, Na 132, K 3.1, BUN 25, Cr 1.30, albumin 1.9, alk phos 207, GFR 58  UA: spec gravity 1.005, protein 30, trace blood, 6-10 WBC, 3-5 RBC  CT chest/abd/p: Findings most suggestive of multiple abscesses in the right hepatic lobe with associated thrombosis of the right hepatic vein consistent with septic thrombophlebitis.   -Findings likely representing acute diverticulitis of the distal descending colon.  -Moderate right pleural effusion with associated segmental and subsegmental consolidations in the posterior right middle lobe and right lower lobe may represent atelectasis and/or pneumonia.  -Indeterminate 1 cm left adrenal nodule.  EKG: sinus tachy rate 121, possible LAE  Given: 650mg PO Tylenol x1, 500mg PO Flagyl x1, IV Zosyn x1, 2L NS bolus x1 (28 Jan 2022 17:21)      ---  HOSPITAL COURSE/PERTINENT LABS/PROCEDURES PERFORMED/PENDING TESTS:   Pt was admitted for sepsis 2/2 hepatic abscesses and septic thrombophlebitis. Pt was placed on a Heparin drip. GI Dr. Acosta and Surgery Dr. Carey were consulted. Pt was to have IR drainage of abscess on 1/31 but due but due to location and bleeding risk, procedure was not done. Heme/onc Dr. Duque was consulted and pt was restarted on AC - Eliquis 5 mg BID. Pt was also found to have a moderate right pleural effusion  with associated segmental and subsegmental consolidations in the posterior right middle lobe and right lower lobe may represent atelectasis and/or pneumonia, ID Dr. Tubbs was consulted and pt was placed on Zosyn for possible PNA. BCx grew Strep intermedius. Abx was changed to Levofloxacin 750mg PO QD and Metronidazole 500mg PO Q8h due to sensitivities. Pt to be treated for at least 4 weeks from negative blood cultures with follow up CTAP to reassess abscess before discontinuing antibiotics.  Repeat BCX no growth and patient was stable for discharge with close outpatient followup. MRCP noted for 1cm adrenal nodule on left and Endocrine Dr. Perlman was consulted. Adrenal imaging with CT washout protocol was done which revealed 13 mm left adrenal nodule compatible with either a complex cyst or small hematoma. Multifocal liver abscesses are mildly decreased in size. Sigmoid diverticulosis and mild pericolic inflammation suggestive of acute diverticulitis. Pt to follow up with endo outpatient.     UPDATED 2/1   Patient is stable for discharge as per primary medical team and consultants.  Patient showed improvement throughout hospitalization. Patient was seen and examined on day of discharge. Patient was medically optimized for discharge with close outpatient follow up.    ---  PATIENT CONDITION:  - stable    --  Vital Signs Last 24 Hrs  T(C): 36.6 (01 Feb 2022 04:40), Max: 36.6 (31 Jan 2022 19:58)  T(F): 97.8 (01 Feb 2022 04:40), Max: 97.8 (31 Jan 2022 19:58)  HR: 92 (01 Feb 2022 04:40) (92 - 114)  BP: 122/75 (01 Feb 2022 04:40) (122/75 - 134/76)  BP(mean): --  RR: 18 (01 Feb 2022 04:40) (18 - 18)  SpO2: 93% (01 Feb 2022 04:40) (93% - 97%)    PHYSICAL EXAM ON DAY OF DISCHARGE:  GENERAL: NAD  HEENT:  anicteric, moist mucous membranes  CHEST/LUNG:  CTA b/l, no rales, wheezes, or rhonchi  HEART:  RRR, S1, S2  ABDOMEN:  BS+, soft, nontender, nondistended  EXTREMITIES: no edema, cyanosis, or calf tenderness  NERVOUS SYSTEM: answers questions and follows commands appropriately    ---  CONSULTANTS:   - GI: Dr. Acosta  - Surgery: Dr. Carey  - ID: Dr. Tubbs  - Pulm: Dr. Hartley   - Heme/onc: Dr. Duque   - Endo: Dr. Perlman     ---  ADVANCED CARE PLANNING:  - Code status:      - MOLST completed:      [  ] NO     [  ] YES    ---  TIME SPENT:  I, the attending physician, was physically present for the key portions of the evaluation and management (E/M) service provided. The total amount of time spent reviewing the hospital notes, laboratory values, imaging findings, assessing/counseling the patient, discussing with consultant physicians, social work, nursing staff was -- minutes       ADMISSION DATE:  01-28-22    ---  FROM ADMISSION H+P:   HPI:  65 yo male PMH CLL (not undergoing treatment), HTN, BPH presents to ED after outpatient CT scan showed multiple hepatic abscess, sent by H/O Dr. Tubbs. Patient states for past 1-2 months he has had intermittent weakness, body aches, chills, fatigue, exertional dyspnea. Patient states over [place]me[place] he was "bed ridden" for days due to exhaustion and rigors. Patient thought he was improving, however this week he had 2 episodes of severe rigors lasting 1.5 hours, followed by periods of severe diaphoresis. Patient regularly follows with Dr. Tubbs and last saw him on Monday 1/24 for his symptoms,  states he was told his WBC was "double" his baseline (baseline WBC 24). Patient was sent for outpatient imaging and got a CT Scan of abd/p earlier this AM. States he then got a call from Dr. Tubbs telling him to come to ED immediately for further evaluation. Patient denies any recent travel, sick contacts, abd pain, N/V/D, CP. Patient still c/o dry cough, exertional dyspnea, exhaustion, rigors, fevers, poor PO intake, myalgias. Denies eating any unusual or raw/undercooked meat.     In ED:  Vitals: T 100.7, , /62, RR 18, SpO2 97% on RA  Labs significant for: WBC 44.77, H/H 9.5/28.4, plt 462, neutrophil 18.8, lymphocyte 24.6, Na 132, K 3.1, BUN 25, Cr 1.30, albumin 1.9, alk phos 207, GFR 58  UA: spec gravity 1.005, protein 30, trace blood, 6-10 WBC, 3-5 RBC  CT chest/abd/p: Findings most suggestive of multiple abscesses in the right hepatic lobe with associated thrombosis of the right hepatic vein consistent with septic thrombophlebitis.   -Findings likely representing acute diverticulitis of the distal descending colon.  -Moderate right pleural effusion with associated segmental and subsegmental consolidations in the posterior right middle lobe and right lower lobe may represent atelectasis and/or pneumonia.  -Indeterminate 1 cm left adrenal nodule.  EKG: sinus tachy rate 121, possible LAE  Given: 650mg PO Tylenol x1, 500mg PO Flagyl x1, IV Zosyn x1, 2L NS bolus x1 (28 Jan 2022 17:21)      ---  HOSPITAL COURSE/PERTINENT LABS/PROCEDURES PERFORMED/PENDING TESTS:   Pt was admitted for sepsis 2/2 hepatic abscesses and septic thrombophlebitis. Pt was placed on a Heparin drip. GI Dr. Acosta and Surgery Dr. Carey were consulted. Pt was to have IR drainage of abscess on 1/31 but due but due to location and bleeding risk, procedure was not done. Heme/onc Dr. Duque was consulted and pt was restarted on AC - Eliquis 5 mg BID. Pt was also found to have a moderate right pleural effusion  with associated segmental and subsegmental consolidations in the posterior right middle lobe and right lower lobe may represent atelectasis and/or pneumonia, ID Dr. Tubbs was consulted and pt was placed on Zosyn for possible PNA. BCx grew Strep intermedius. Abx was changed to Levofloxacin 750mg PO QD and Metronidazole 500mg PO Q8h due to sensitivities. F/u blood cultures were negative. Pt to be treated for at least 4 weeks from negative blood cultures with follow up CTAP to reassess abscess before discontinuing antibiotics. Repeat BCX no growth and patient was stable for discharge with close outpatient followup. MRCP noted for 1cm adrenal nodule on left and Endocrine Dr. Perlman was consulted. Adrenal imaging with CT washout protocol was done which revealed 13 mm left adrenal nodule compatible with either a complex cyst or small hematoma. Multifocal liver abscesses are mildly decreased in size. Sigmoid diverticulosis and mild pericolic inflammation suggestive of acute diverticulitis. Pt to follow up with endo outpatient.     UPDATED 2/1   Patient is stable for discharge as per primary medical team and consultants.  Patient showed improvement throughout hospitalization. Patient was seen and examined on day of discharge. Patient was medically optimized for discharge with close outpatient follow up.    ---  PATIENT CONDITION:  - stable    --  T(C): 36.4 (02-02-22 @ 05:38), Max: 36.9 (02-01-22 @ 20:34)  HR: 100 (02-02-22 @ 05:38) (100 - 104)  BP: 144/83 (02-02-22 @ 05:38) (126/77 - 144/83)  RR: 18 (02-02-22 @ 05:38) (17 - 18)  SpO2: 94% (02-02-22 @ 05:38) (93% - 95%)    PHYSICAL EXAM ON DAY OF DISCHARGE:  GENERAL: NAD  HEENT:  anicteric, moist mucous membranes  CHEST/LUNG:  CTA b/l, no rales, wheezes, or rhonchi  HEART:  RRR, S1, S2  ABDOMEN:  BS+, soft, nontender, nondistended  EXTREMITIES: no edema, cyanosis, or calf tenderness  NERVOUS SYSTEM: answers questions and follows commands appropriately    ---  CONSULTANTS:   - GI: Dr. Acosta  - Surgery: Dr. Carey  - ID: Dr. Roxanna Brand   - Pulm: Dr. Hartley   - Heme/onc: Dr. Duque   - Endo: Dr. Perlman     ---  ADVANCED CARE PLANNING:  - Code status:      - MOLST completed:      [  ] NO     [  ] YES    ---  TIME SPENT:  I, the attending physician, was physically present for the key portions of the evaluation and management (E/M) service provided. The total amount of time spent reviewing the hospital notes, laboratory values, imaging findings, assessing/counseling the patient, discussing with consultant physicians, social work, nursing staff was -- minutes       ADMISSION DATE:  01-28-22    ---  FROM ADMISSION H+P:   HPI:  65 yo male PMH CLL (not undergoing treatment), HTN, BPH presents to ED after outpatient CT scan showed multiple hepatic abscess, sent by H/O Dr. Tubbs. Patient states for past 1-2 months he has had intermittent weakness, body aches, chills, fatigue, exertional dyspnea. Patient states over [place]meen he was "bed ridden" for days due to exhaustion and rigors. Patient thought he was improving, however this week he had 2 episodes of severe rigors lasting 1.5 hours, followed by periods of severe diaphoresis. Patient regularly follows with Dr. Tubbs and last saw him on Monday 1/24 for his symptoms,  states he was told his WBC was "double" his baseline (baseline WBC 24). Patient was sent for outpatient imaging and got a CT Scan of abd/p earlier this AM. States he then got a call from Dr. Tubbs telling him to come to ED immediately for further evaluation. Patient denies any recent travel, sick contacts, abd pain, N/V/D, CP. Patient still c/o dry cough, exertional dyspnea, exhaustion, rigors, fevers, poor PO intake, myalgias. Denies eating any unusual or raw/undercooked meat.       ---  HOSPITAL COURSE/PERTINENT LABS/PROCEDURES PERFORMED/PENDING TESTS:   Pt was admitted for sepsis  poa 2/2 hepatic abscesses and septic thrombophlebitis. Pt was placed on a Heparin drip. GI Dr. Acosta and Surgery Dr. Carey were consulted. Pt was to have IR drainage of abscess on 1/31 but due but due to location and bleeding risk, procedure was not done. Heme/onc Dr. Duque was consulted and pt was restarted on AC - Eliquis 5 mg BID. Pt was also found to have a moderate right pleural effusion  with associated segmental and subsegmental consolidations in the posterior right middle lobe and right lower lobe may represent atelectasis and/or pneumonia, ID Dr. Tubbs was consulted and pt was placed on Zosyn for possible PNA. BCx grew Strep intermedius. Strep bacteremia secondary to liver abscesses likely due to Acute diverticulitis   -- Strep intermedius sensitivities reviewed, pansensitive  Abx was changed to Levofloxacin 750mg PO QD and Metronidazole 500mg PO Q8h due to sensitivities. F/u blood cultures were negative. Pt to be treated for at least 4 weeks from negative blood cultures with follow up CTAP to reassess abscess before discontinuing antibiotics. Repeat BCX no growth and patient was stable for discharge with close outpatient followup.  sinus  tachy  asymptomatic resolved .   MRCP noted for 1cm adrenal nodule on left and Endocrine Dr. Perlman was consulted. Adrenal imaging with CT washout protocol was done which revealed 13 mm left adrenal nodule compatible with either a complex cyst or small hematoma. Multifocal liver abscesses are mildly decreased in size. Sigmoid diverticulosis and mild pericolic inflammation suggestive of acute diverticulitis. Pt to follow up with endo outpatient.     Patient showed improvement throughout hospitalization.   Patient was seen and examined on day of discharge.   Patient was medically optimized for discharge with close outpatient follow up.    2/2/22 physical exam day of dc   PATIENT CONDITION:  - stable    --  T(C): 36.4 (02-02-22 @ 05:38), Max: 36.9 (02-01-22 @ 20:34)  HR: 100 (02-02-22 @ 05:38) (100 - 104)  BP: 144/83 (02-02-22 @ 05:38) (126/77 - 144/83)  RR: 18 (02-02-22 @ 05:38) (17 - 18)  SpO2: 94% (02-02-22 @ 05:38) (93% - 95%)    PHYSICAL EXAM ON DAY OF DISCHARGE:  GENERAL: NAD  HEENT:  anicteric, moist mucous membranes  CHEST/LUNG:  CTA b/l, no rales, wheezes, or rhonchi  HEART:  RRR, S1, S2 ,  sinus mild   Tachy   ABDOMEN:  BS+, soft, nontender, nondistended  EXTREMITIES: no edema, cyanosis, or calf tenderness  NERVOUS SYSTEM: answers questions and follows commands appropriately  gu intact   ---  CONSULTANTS:   - GI: Dr. Acosta  - Surgery: Dr. Carey  - ID: Dr. Roxanna Brand   - Pulm: Dr. Hartley   - Heme/onc: Dr. Duque   - Endo: Dr. Perlman     ---  ADVANCED CARE PLANNING:  - Code status:      - MOLST completed:      [  ] NO     [  ] YES    ---  TIME SPENT:  I, the attending physician, was physically present for the key portions of the evaluation and management (E/M) service provided. The total amount of time spent reviewing the hospital notes, laboratory values, imaging findings, assessing/counseling the patient, discussing with consultant physicians, social work, nursing staff was 45  minutes

## 2022-01-31 NOTE — DISCHARGE NOTE PROVIDER - NSDCCPCAREPLAN_GEN_ALL_CORE_FT
PRINCIPAL DISCHARGE DIAGNOSIS  Diagnosis: Liver abscess  Assessment and Plan of Treatment: You were admitted for multiple liver abscesses that were seen on imaging. Gastroenteroology and surgery evaluated you. You were supposed to have a procedure where the abscess is drained but due to location and bleeding risk, the procedure was not done.   Please follow up with your PCP, gastroenterologist and hematologist within 1 week of discharge for repeat CT scan of your abdomen.        SECONDARY DISCHARGE DIAGNOSES  Diagnosis: Bacteremia  Assessment and Plan of Treatment: You were found to have bacteria growing in your blood and were started on antibiotics. Infectious disease specialist evaluated you during your hospital stay.   - Please CONTINUE Levofloxacin 750mg PO QD and Metronidazole 500mg PO Q8h --------, a prescription was sent to your pharmacy. You will need to be on this antibiotic for atleast 4 weeks.   - Please follow up with infectious disease specialist within 1 week of discharge.   - You will need repeat CT scan of your abdomen before stopping your antibiotics.    Diagnosis: Splenic vein thrombosis  Assessment and Plan of Treatment: You were found to have thrombosis (occlusion) of your splenic vein. You were started on blood thinners.   - Please CONTINUE Eliquis 5 mg twice a day, a prescription was sent to your pharmacy. You will need to be on this blood thinner for atleast 3 months.  - Please follow up with your hematologist within 1 week of discharge.    Diagnosis: Adrenal nodule  Assessment and Plan of Treatment: You were found to have a 1cm adrenal nodule on the left  on imaging. Endocrinologist evaluated you during your hospital stay. You had a CT scan which revealed a 13 mm left adrenal nodule compatible with either a complex cyst or small hematoma.   - Please follow up with Endocrine within 1 week of discharge.    Diagnosis: PNA (pneumonia)  Assessment and Plan of Treatment:     Diagnosis: Diverticulitis  Assessment and Plan of Treatment:     Diagnosis: Sepsis  Assessment and Plan of Treatment:      PRINCIPAL DISCHARGE DIAGNOSIS  Diagnosis: Liver abscess  Assessment and Plan of Treatment: You were admitted for multiple liver abscesses that were seen on imaging. Gastroenteroology and surgery evaluated you. You were supposed to have a procedure where the abscess is drained but due to location and bleeding risk, the procedure was not done.   Please follow up with your PCP, gastroenterologist and hematologist within 1 week of discharge to review your medications and get repeat CT scan of your abdomen to assess the abscess.        SECONDARY DISCHARGE DIAGNOSES  Diagnosis: Splenic vein thrombosis  Assessment and Plan of Treatment: You were found to have thrombosis (occlusion) of your splenic vein. You were started on blood thinners.   - Please CONTINUE Eliquis 5 mg twice a day, a prescription was sent to your pharmacy. You will need to be on this blood thinner for atleast 3 months.  - Please follow up with your hematologist/oncologist within 1 week of discharge to go over this.    Diagnosis: Bacteremia  Assessment and Plan of Treatment: You were found to have bacteria growing in your blood and were started on antibiotics. Infectious disease specialist evaluated you during your hospital stay.   - Please CONTINUE Levofloxacin 750mg PO QD and Metronidazole 500mg PO Q8h for at least 4 weeks; a prescription was sent to your pharmacy.   - Please follow up with infectious disease specialist Dr. Bridget Brand within 1 week of discharge regarding these antibiotics.   - You will need repeat CT scan of your abdomen before stopping your antibiotics.    Diagnosis: Adrenal nodule  Assessment and Plan of Treatment: You were found to have a 1cm adrenal nodule on the left  on imaging. Endocrinologist evaluated you during your hospital stay. You had a CT scan which revealed a 13 mm left adrenal nodule compatible with either a complex cyst or small hematoma.   - Please follow up with Endocrine Dr. Perlman within 1 week of discharge regarding this nodule.     PRINCIPAL DISCHARGE DIAGNOSIS  Diagnosis: Liver abscess  Assessment and Plan of Treatment: You were admitted for multiple liver abscesses that were seen on imaging. Gastroenteroology and surgery evaluated you. You were supposed to have a procedure where the abscess is drained but due to location  of abscess , the procedure was hold and with iv abx imrovement noticed .   Please follow up with your PCP, gastroenterologist and hematologist within 1 week of discharge to review your medications and get repeat CT scan of your abdomen to assess the abscess.        SECONDARY DISCHARGE DIAGNOSES  Diagnosis: Splenic vein thrombosis  Assessment and Plan of Treatment: You were found to have thrombosis (occlusion) of your splenic vein. You were started on blood thinners.   - Please CONTINUE Eliquis 5 mg twice a day, a prescription was sent to your pharmacy. You will need to be on this blood thinner for atleast 3 months.  - Please follow up with your hematologist/oncologist within 1 week of discharge to go over this.    Diagnosis: Bacteremia  Assessment and Plan of Treatment: You were found to have bacteria growing in your blood and were started on antibiotics. Infectious disease specialist evaluated you during your hospital stay.   - Please CONTINUE Levofloxacin 750mg PO QD and Metronidazole 500mg PO Q8h for at least 4 weeks; a prescription was sent to your pharmacy.   - Please follow up with infectious disease specialist Dr. Bridget Brand within 1 week of discharge regarding these antibiotics.   - You will need repeat CT scan of your abdomen before stopping your antibiotics.    Diagnosis: Adrenal nodule  Assessment and Plan of Treatment: You were found to have a 1cm adrenal nodule on the left  on imaging. Endocrinologist evaluated you during your hospital stay. You had a CT scan which revealed a 13 mm left adrenal nodule compatible with either a complex cyst or small hematoma.   - Please follow up with Endocrine Dr. Perlman within 1 week of discharge regarding this nodule.

## 2022-01-31 NOTE — PROGRESS NOTE ADULT - SUBJECTIVE AND OBJECTIVE BOX
Asked by primary team to re-evaluate patient with respect to anticoagulation planning  Was initially planned for IR drainage of liver abscess today but given high risk due to location of abscess and response to anticoagulation, procedure cancelled  Patient seen and examined;  Chart reviewed and events noted;       MEDICATIONS  (STANDING):  BACItracin   Ointment 1 Application(s) Topical daily  heparin  Infusion. 1600 Unit(s)/Hr (16 mL/Hr) IV Continuous <Continuous>  lactobacillus acidophilus 1 Tablet(s) Oral two times a day with meals  pantoprazole    Tablet 40 milliGRAM(s) Oral before breakfast  piperacillin/tazobactam IVPB.. 3.375 Gram(s) IV Intermittent every 8 hours  tamsulosin 0.4 milliGRAM(s) Oral at bedtime    MEDICATIONS  (PRN):  heparin   Injectable 6500 Unit(s) IV Push every 6 hours PRN For aPTT less than 40  heparin   Injectable 3000 Unit(s) IV Push every 6 hours PRN For aPTT between 40 - 57  ondansetron Injectable 4 milliGRAM(s) IV Push every 8 hours PRN Nausea and/or Vomiting      Vital Signs Last 24 Hrs  T(C): 36.4 (31 Jan 2022 05:35), Max: 36.8 (30 Jan 2022 14:15)  T(F): 97.5 (31 Jan 2022 05:35), Max: 98.3 (30 Jan 2022 14:15)  HR: 93 (31 Jan 2022 05:35) (93 - 104)  BP: 123/74 (31 Jan 2022 05:35) (110/70 - 123/74)  BP(mean): --  RR: 18 (31 Jan 2022 05:35) (18 - 18)  SpO2: 94% (31 Jan 2022 05:35) (94% - 94%)    PHYSICAL EXAM  General: adult in NAD  HEENT: clear oropharynx, anicteric sclera, pink conjunctivae  Neck: supple  CV: normal S1S2 with no murmur rubs or gallops  Lungs: clear to auscultation, no wheezes, no rhales  Abdomen: soft non-tender non-distended, no hepato/splenomegaly  Ext: no clubbing cyanosis or edema  Skin: no rashes and no petichiae  Neuro: alert and oriented X3 no focal deficits      LABS:                        9.3    29.22 )-----------( 512      ( 31 Jan 2022 09:08 )             28.3     WBC Count: 29.22 K/uL (01-31 @ 09:08)  WBC Count: 30.37 K/uL (01-30 @ 08:42)  WBC Count: 37.21 K/uL (01-29 @ 15:13)  WBC Count: 34.86 K/uL (01-29 @ 08:08)  WBC Count: 37.35 K/uL (01-29 @ 00:41)        01-31    137  |  107  |  14  ----------------------------<  102<H>  3.5   |  24  |  1.10    Ca    8.4<L>      31 Jan 2022 09:08    TPro  6.3  /  Alb  1.6<L>  /  TBili  0.4  /  DBili  x   /  AST  17  /  ALT  22  /  AlkPhos  144<H>  01-30    PT/INR - ( 31 Jan 2022 10:16 )   PT: 15.0 sec;   INR: 1.30 ratio    PTT - ( 31 Jan 2022 10:16 )  PTT:28.9 sec

## 2022-01-31 NOTE — CHART NOTE - NSCHARTNOTEFT_GEN_A_CORE
Plan d/w Surgery Dr. Carey and GI, no plan for IR drainage of the abscess, since high risk of complications due to location. Will transition to Eliquis at this time since no plan for any surgical intervention or IR procedure. Hematology recommended Eliquis 5mg BID x 3 months

## 2022-01-31 NOTE — PROGRESS NOTE ADULT - SUBJECTIVE AND OBJECTIVE BOX
[facility]  Ray Coats PA-C  [address] [place] [address]  [phone]      INTERVAL HPI/OVERNIGHT EVENTS:  Seen and examined, chart reviewed  reports improvements in diarrhea  offers no further GI complaints            MEDICATIONS  (STANDING):  heparin  Infusion. 1600 Unit(s)/Hr (16 mL/Hr) IV Continuous <Continuous>  lactobacillus acidophilus 1 Tablet(s) Oral two times a day with meals  pantoprazole    Tablet 40 milliGRAM(s) Oral before breakfast  piperacillin/tazobactam IVPB.. 3.375 Gram(s) IV Intermittent every 8 hours  tamsulosin 0.4 milliGRAM(s) Oral at bedtime    MEDICATIONS  (PRN):  heparin   Injectable 6500 Unit(s) IV Push every 6 hours PRN For aPTT less than 40  heparin   Injectable 3000 Unit(s) IV Push every 6 hours PRN For aPTT between 40 - 57  ondansetron Injectable 4 milliGRAM(s) IV Push every 8 hours PRN Nausea and/or Vomiting      Allergies    No Known Allergies    Intolerances            ROS:   General:  No wt loss, fevers, chills, night sweats, fatigue,   Eyes:  Good vision, no reported pain  ENT:  No sore throat, pain, runny nose, dysphagia  CV:  No pain, palpitations, hypo/hypertension  Resp:  No dyspnea, cough, tachypnea, wheezing  GI:  Pos diarrhea No pain, No nausea, No vomiting,, No constipation, No weight loss, No fever, No pruritis, No rectal bleeding, No tarry stools, No dysphagia,  :  No pain, bleeding, incontinence, nocturia  Muscle:  No pain, weakness  Neuro:  No weakness, tingling, memory problems  Psych:  No fatigue, insomnia, mood problems, depression  Endocrine:  No polyuria, polydipsia, cold/heat intolerance  Heme:  No petechiae, ecchymosis, easy bruisability  Skin:  No rash, tattoos, scars, edema      PHYSICAL EXAM  Vital Signs Last 24 Hrs  T(C): 37.2 (29 Jan 2022 20:46), Max: 37.7 (29 Jan 2022 13:58)  T(F): 98.9 (29 Jan 2022 20:46), Max: 99.8 (29 Jan 2022 13:58)  HR: 102 (29 Jan 2022 20:46) (102 - 102)  BP: 110/69 (29 Jan 2022 20:46) (104/62 - 110/69)  BP(mean): --  RR: 18 (29 Jan 2022 20:46) (18 - 18)  SpO2: 92% (29 Jan 2022 20:46) (92% - 95%)          GENERAL:  Appears stated age,   HEENT:  NC/AT,    CHEST:  Full & symmetric excursion,   HEART:  Regular rhythm,  ABDOMEN:  Soft, non-tender, non-distended,  EXTEREMITIES:  no cyanosis  SKIN:  No rash  NEURO:  Alert,       LABS:                        8.4    30.37 )-----------( 443      ( 30 Jan 2022 08:42 )             25.5     01-30    138  |  109<H>  |  16  ----------------------------<  115<H>  3.7   |  24  |  1.20    Ca    8.2<L>      30 Jan 2022 08:42  Phos  3.1     01-29  Mg     2.0     01-29    TPro  6.3  /  Alb  1.6<L>  /  TBili  0.4  /  DBili  x   /  AST  17  /  ALT  22  /  AlkPhos  144<H>  01-30    PT/INR - ( 28 Jan 2022 14:12 )   PT: 17.4 sec;   INR: 1.52 ratio         PTT - ( 29 Jan 2022 22:43 )  PTT:59.8 sec      01-29-22 @ 07:01  -  01-30-22 @ 07:00  --------------------------------------------------------  IN: 364 mL / OUT: 400 mL / NET: -36 mL                Culture - Urine (collected 28 Jan 2022 19:03)  Source: Clean Catch Clean Catch (Midstream)  Final Report (29 Jan 2022 14:35):    <10,000 CFU/mL Normal Urogenital Kera    Culture - Blood (collected 28 Jan 2022 18:52)  Source: .Blood Blood-Peripheral  Gram Stain (30 Jan 2022 10:56):    Growth in anaerobic bottle: Gram Positive Cocci in Pairs and Chains  Preliminary Report (30 Jan 2022 10:56):    Growth in anaerobic bottle: Gram Positive Cocci in Pairs and Chains    ***Blood Panel PCR results on this specimen are available    approximately 3 hours after the Gram stain result.***    Gram stain, PCR, and/or culture results may not always    correspond due to difference in methodologies.    ************************************************************    This PCR assay was performed by multiplex PCR. This    Assay tests for 66 bacterial and resistance gene targets.    Please refer to the [facility] test directory    at [email or web address] for details.    Culture - Blood (collected 28 Jan 2022 18:52)  Source: .Blood Blood-Peripheral  Preliminary Report (29 Jan 2022 19:02):    No growth to date.        RADIOLOGY & ADDITIONAL TESTS:

## 2022-01-31 NOTE — PROGRESS NOTE ADULT - SUBJECTIVE AND OBJECTIVE BOX
[facility], Division of Infectious Diseases  MELE Damon Y. Patel, S. Shah, G. Casimir  [phone]  ([phone] - weekdays after 5pm and weekends)    Name: SAMI MILES  Age/Gender: 66y Male  MRN: [id number]    Interval History:  Patient seen this morning.  Feels ok, waiting to go to IR.    Had 3-4 soft BMs since yesterday, no pain.   Notes reviewed. Afebrile     Allergies: No Known Allergies    Objective:  Vitals:   T(F): 97.5 (01-31-22 @ 05:35), Max: 98.3 (01-30-22 @ 14:15)  HR: 93 (01-31-22 @ 05:35) (93 - 104)  BP: 123/74 (01-31-22 @ 05:35) (110/70 - 123/74)  RR: 18 (01-31-22 @ 05:35) (18 - 18)  SpO2: 94% (01-31-22 @ 05:35) (94% - 94%)  Physical Examination:  General: no acute distress  HEENT: NC/AT, anicteric, neck supple  Respiratory: clear to auscultation bilaterally  Cardiovascular: S1 and S2 present, normal rate  Gastrointestinal: sot, nontender, nondistended  Extremities: no edema, no cyanosis  Skin: no visible rash    Laboratory Studies:  CBC:                       9.3    29.22 )-----------( 512      ( 31 Jan 2022 09:08 )             28.3     WBC Trend:  29.22 01-31-22 @ 09:08  30.37 01-30-22 @ 08:42  37.21 01-29-22 @ 15:13  34.86 01-29-22 @ 08:08  37.35 01-29-22 @ 00:41  44.77 01-28-22 @ 14:12    CMP: 01-31    137  |  107  |  14  ----------------------------<  102<H>  3.5   |  24  |  1.10    Ca    8.4<L>      31 Jan 2022 09:08    TPro  6.3  /  Alb  1.6<L>  /  TBili  0.4  /  DBili  x   /  AST  17  /  ALT  22  /  AlkPhos  144<H>  01-30    LIVER FUNCTIONS - ( 30 Jan 2022 08:42 )  Alb: 1.6 g/dL / Pro: 6.3 g/dL / ALK PHOS: 144 U/L / ALT: 22 U/L / AST: 17 U/L / GGT: x           Microbiology: reviewed   Culture - Urine (collected 01-28-22 @ 19:03)  Source: Clean Catch Clean Catch (Midstream)  Final Report (01-29-22 @ 14:35):    <10,000 CFU/mL Normal Urogenital Kera    Culture - Blood (collected 01-28-22 @ 18:52)  Source: .Blood Blood-Peripheral  Gram Stain (01-30-22 @ 17:37):    Growth in anaerobic bottle: Gram Positive Cocci in Pairs and Chains    Growth in aerobic bottle: Gram positive cocci in pairs  Preliminary Report (01-31-22 @ 11:03):    Growth in anaerobic bottle: Streptococcus intermedius Susceptibility to    follow.    Growth in aerobic bottle: Gram positive cocci in pairs    ***Blood Panel PCR results on this specimen are available    approximately 3 hours after the Gram stain result.***    Gram stain, PCR, and/or culture results may not always    correspond due to difference in methodologies.    ************************************************************    This PCR assay was performed by multiplex PCR. This    Assay tests for 66 bacterialand resistance gene targets.    Please refer to the [facility] test directory    at [email or web address] for details.  Organism: Blood Culture PCR (01-30-22 @ 12:49)  Organism: Blood Culture PCR (01-30-22 @ 12:49)      -  Streptococcus anginosus group: Detec      Method Type: PCR    Culture - Blood (collected 01-28-22 @ 18:52)  Source: .Blood Blood-Peripheral  Gram Stain (01-30-22 @ 17:42):    Growth in aerobic bottle: Gram Positive Cocci in Pairs and Chains    Growth in anaerobic bottle: Gram Positive Cocci in Pairs and Chains  Preliminary Report (01-30-22 @ 17:43):    Growth in aerobic bottle: Gram Positive Cocci in Pairs and Chains    Growth in anaerobic bottle: Gram Positive Cocci in Pairs and Chains    Radiology: reviewed   MR MRCP w/wo IV Cont (01.28.22 @ 19:55) >IMPRESSION: Multifocal liver abscesses of segments 7, 8 and 4A/B with associated hepatic vein septic thrombophlebitis. Additional developing abscesses/phlegmonous change of segments 2/3, 5, and 6. Findings of diverticulitis seen on the recent CT are not imaged on this study. Moderate right pleural effusion. Indeterminate 1 cm left adrenal nodule, is suboptimally assessed due to small size    OUTPT imaging:  CT chest/abd/p: Findings most suggestive of multiple abscesses in the right hepatic lobe with associated thrombosis of the right hepatic vein consistent with septic thrombophlebitis.   -Findings likely representing acute diverticulitis of the distal descending colon.  -Moderate right pleural effusion with associated segmental and subsegmental consolidations in the posterior right middle lobe and right lower lobe may represent atelectasis and/or pneumonia.  -Indeterminate 1 cm left adrenal nodule.    Medications:  BACItracin   Ointment 1 Application(s) Topical daily  heparin   Injectable 6500 Unit(s) IV Push every 6 hours PRN  heparin   Injectable 3000 Unit(s) IV Push every 6 hours PRN  heparin  Infusion. 1600 Unit(s)/Hr IV Continuous <Continuous>  lactobacillus acidophilus 1 Tablet(s) Oral two times a day with meals  ondansetron Injectable 4 milliGRAM(s) IV Push every 8 hours PRN  pantoprazole    Tablet 40 milliGRAM(s) Oral before breakfast  piperacillin/tazobactam IVPB.. 3.375 Gram(s) IV Intermittent every 8 hours  tamsulosin 0.4 milliGRAM(s) Oral at bedtime    Antimicrobials:  piperacillin/tazobactam IVPB.. 3.375 Gram(s) IV Intermittent every 8 hours

## 2022-01-31 NOTE — PROGRESS NOTE ADULT - ASSESSMENT
liver abscess  diverticulitis  abdominal pain    regular diet  iv antibiotics  ID eval  cultures noted  ? septic thrombophlebitis  cont hep gtt  mri noted  may need picc  case d/w IR; high risk for drainage given location and need for a/c  given clinical response to antibiotics will hold off  will follow    Advanced care planning was discussed with patient and family.  Advanced care planning forms were reviewed and discussed.  Risks, benefits and alternatives of gastroenterologic procedures were discussed in detail and all questions were answered.    30 minutes spent.

## 2022-01-31 NOTE — PROGRESS NOTE ADULT - PROBLEM SELECTOR PLAN 5
H/H 9.5/28.4on admission, unknown baseline  -Monitor closely for signs of bleeding given patient to be started on heparin gtt  -F/u iron studies  -Monitor daily CBC  -Follows with heme Dr. Tubbs outpatient H/H 9.5/28.4on admission, unknown baseline  - Monitor closely for signs of bleeding given patient to be started on heparin gtt  - iron studies noted  - Hgb stable, patient with no gross bleeding or anemia sx   - Monitor daily CBC  - Follows with heme Dr. Tubbs outpatient for CLL

## 2022-01-31 NOTE — PROGRESS NOTE ADULT - PROBLEM SELECTOR PLAN 9
Takes Losartan-HCTZ 100-25mg daily at home  -Hold home meds given slightly hypotensive on admission  -Monitor routine hemodynamics Takes Losartan-HCTZ 100-25mg daily at home  - Hold home meds given slightly hypotensive on admission  - Monitor routine hemodynamics  - Can resume if patient becomes hypertensive

## 2022-01-31 NOTE — PROGRESS NOTE ADULT - SUBJECTIVE AND OBJECTIVE BOX
Patient is a 66y old  Male who presents with a chief complaint of hepatic abscess (31 Jan 2022 05:19)      INTERVAL HPI/OVERNIGHT EVENTS: no acute events overnight. pt seen and examined at bedside this morning. reports one episode of stool colored diarrhea ~730am today. No watery diarrhea. Denies any pain, and reports his fatigue, chills, body aches have subsided. Denies fever, chills, cp, sob, abd pain, n/v.     MEDICATIONS  (STANDING):  heparin  Infusion. 1600 Unit(s)/Hr (16 mL/Hr) IV Continuous <Continuous>  lactobacillus acidophilus 1 Tablet(s) Oral two times a day with meals  pantoprazole    Tablet 40 milliGRAM(s) Oral before breakfast  piperacillin/tazobactam IVPB.. 3.375 Gram(s) IV Intermittent every 8 hours  tamsulosin 0.4 milliGRAM(s) Oral at bedtime    MEDICATIONS  (PRN):  heparin   Injectable 6500 Unit(s) IV Push every 6 hours PRN For aPTT less than 40  heparin   Injectable 3000 Unit(s) IV Push every 6 hours PRN For aPTT between 40 - 57  ondansetron Injectable 4 milliGRAM(s) IV Push every 8 hours PRN Nausea and/or Vomiting      Allergies    No Known Allergies    Intolerances        REVIEW OF SYSTEMS:  CONSTITUTIONAL: No fever or chills  HEENT:  No headache, no sore throat  RESPIRATORY: No cough, wheezing, or shortness of breath  CARDIOVASCULAR: No chest pain, palpitations  GASTROINTESTINAL: No abd pain, nausea, vomiting, + diarrhea x 1 episode today am   GENITOURINARY: No dysuria, frequency, or hematuria  NEUROLOGICAL: no focal weakness or dizziness  MUSCULOSKELETAL: no myalgias     Vital Signs Last 24 Hrs  T(C): 36.4 (31 Jan 2022 05:35), Max: 36.8 (30 Jan 2022 14:15)  T(F): 97.5 (31 Jan 2022 05:35), Max: 98.3 (30 Jan 2022 14:15)  HR: 93 (31 Jan 2022 05:35) (93 - 104)  BP: 123/74 (31 Jan 2022 05:35) (110/70 - 123/74)  BP(mean): --  RR: 18 (31 Jan 2022 05:35) (18 - 18)  SpO2: 94% (31 Jan 2022 05:35) (94% - 94%)    PHYSICAL EXAM:  GENERAL: NAD, pleasant male, AOx3  HEENT:  anicteric, moist mucous membranes  CHEST/LUNG:  decreased bs right lower lobe, cta other lung fields. no rales, wheezes, or rhonchi  HEART:  RRR, S1, S2  ABDOMEN:  BS+, soft, nontender, nondistended  EXTREMITIES: no edema, cyanosis, or calf tenderness  NERVOUS SYSTEM: answers questions and follows commands appropriately    LABS:    CBC Full  -  ( 30 Jan 2022 08:42 )  WBC Count : 30.37 K/uL  Hemoglobin : 8.4 g/dL  Hematocrit : 25.5 %  Platelet Count - Automated : 443 K/uL  Mean Cell Volume : 91.4 fl  Mean Cell Hemoglobin : 30.1 pg  Mean Cell Hemoglobin Concentration : 32.9 gm/dL  Auto Neutrophil # : 10.60 K/uL  Auto Lymphocyte # : 18.68 K/uL  Auto Monocyte # : 0.56 K/uL  Auto Eosinophil # : 0.08 K/uL  Auto Basophil # : 0.14 K/uL  Auto Neutrophil % : 34.9 %  Auto Lymphocyte % : 61.5 %  Auto Monocyte % : 1.8 %  Auto Eosinophil % : 0.3 %  Auto Basophil % : 0.5 %      Ca    8.2        30 Jan 2022 08:42      PTT - ( 29 Jan 2022 22:43 )  PTT:59.8 sec    CAPILLARY BLOOD GLUCOSE            Culture - Urine (collected 01-28-22 @ 19:03)  Source: Clean Catch Clean Catch (Midstream)  Final Report (01-29-22 @ 14:35):    <10,000 CFU/mL Normal Urogenital Kera    Culture - Blood (collected 01-28-22 @ 18:52)  Source: .Blood Blood-Peripheral  Gram Stain (01-30-22 @ 17:37):    Growth in anaerobic bottle: Gram Positive Cocci in Pairs and Chains    Growth in aerobic bottle: Gram positive cocci in pairs  Preliminary Report (01-30-22 @ 17:37):    Growth in anaerobic bottle: Gram Positive Cocci in Pairs and Chains    Growth in aerobic bottle: Gram positive cocci in pairs    ***Blood Panel PCR results on this specimen are available    approximately 3 hours after the Gram stain result.***    Gram stain, PCR, and/or culture results may not always    correspond due to difference in methodologies.    ************************************************************    This PCR assay was performed by multiplex PCR. This    Assay tests for 66 bacterial and resistance gene targets.    Please refer to the [facility] Labs test directory    at [email or web address] for details.  Organism: Blood Culture PCR (01-30-22 @ 12:49)  Organism: Blood Culture PCR (01-30-22 @ 12:49)      -  Streptococcus anginosus group: Detec      Method Type: PCR    Culture - Blood (collected 01-28-22 @ 18:52)  Source: .Blood Blood-Peripheral  Gram Stain (01-30-22 @ 17:42):    Growth in aerobic bottle: Gram Positive Cocci in Pairs and Chains    Growth in anaerobic bottle: Gram Positive Cocci in Pairs and Chains  Preliminary Report (01-30-22 @ 17:43):    Growth in aerobic bottle: Gram Positive Cocci in Pairs and Chains    Growth in anaerobic bottle: Gram Positive Cocci in Pairs and Chains        RADIOLOGY & ADDITIONAL TESTS:    Personally reviewed.     Consultant(s) Notes Reviewed:  [x] YES  [ ] NO

## 2022-01-31 NOTE — DISCHARGE NOTE PROVIDER - NSDCFUADDAPPT_GEN_ALL_CORE_FT
- Please make an appointment for follow up with your primary doctor in 1-3 days  - Please make an appointment for follow up with Endocrine, Heme/onc, Pulm (information above)  - Patient or caretaker is responsible for making all appointments  - Please return to the ED if you develop worsening symptoms or fever  - Please make an appointment for follow up with your primary doctor in 1-3 days  - Please make an appointment for follow up with Endocrine, Heme/onc, Pulm (information above)  - fu up your GASTROENTEROLOGISTS  in 2 wk , and repeat ct abd /pelvis in 3 to 4wk to check complete resolution of liver abscess .  - Please make an appointment for follow up with your primary doctor in 1-3 days  - Please make an appointment for follow up with Endocrine, Heme/onc, Pulm (information above)  - fu up your GASTROENTEROLOGISTS   DR zee DELGADO   in 2 wk , and repeat ct abd /pelvis in 3 to 4wk to check complete resolution of liver abscess .

## 2022-01-31 NOTE — DISCHARGE NOTE PROVIDER - CARE PROVIDER_API CALL
Perlman, Craig D  [facility]  [address] [place] [address], [address]  [place] [address]  Phone: [phone]  Fax: [phone]  Follow Up Time: 1 week    Hon VANDANA Tubbs (MD)  Hematology; Internal Medicine; Medical Oncology  [address] [facility] [address], [address]  [place] [address]  Phone: [phone]  Fax: [phone]  Follow Up Time: 1 week    JESSICA TEMPLE  Internal Medicine  [address] [place] [address]  Phone: [phone]  Fax: [phone]  Follow Up Time: 1 week   Perlman, Craig D  MEDICINE  [address] [facility], [address]  [place] [address]  Phone: [phone]  Fax: [phone]  Follow Up Time: 1 week    Hon VANDANA Tubbs)  Hematology; Internal Medicine; Medical Oncology  [address] [facility], [address]  [place] [address]  Phone: [phone]  Fax: [phone]  Follow Up Time: 1 week    JESSICA TEMPLE  Internal Medicine  [address] [place] [address]  Phone: [phone]  Fax: [phone]  Follow Up Time: 1 week    Roxanna Brand)  Internal Medicine  [address] [facility], [address]  [place] [address]  Phone: [phone]  Fax: [phone]  Follow Up Time: 1 week   Hon VANDANA Tubbs (MD)  Hematology; Internal Medicine; Medical Oncology  [address] [facility], [address]  [place] [address]  Phone: [phone]  Fax: [phone]  Follow Up Time: 1 week    Roxanna Brand)  Internal Medicine  [address] [facility], [address]  [place] [address]  Phone: [phone]  Fax: [phone]  Follow Up Time: 1 week    DANNY koch  fu up / gi  Phone: (   )    -  Fax: (   )    -  Follow Up Time:

## 2022-01-31 NOTE — PROGRESS NOTE ADULT - ASSESSMENT
Patient is a 66 year old male with PMH of CLL, HTN, BPH hx diverticulitis ( last flare 18 months ago ) admitted with   Strep bacteremia secondary to liver abscesses likely due to Acute diverticulitis  +thrombosis of the right hepatic vein consistent with septic thrombophlebitis  Pleural effusion and atelectasis likely in setting of abscess - doubt pneumonia, Pulm following   Wbc downtrending - Pt has CLL thus not good marker for infectious process    Recommendations:   IR drainage of Liver abscess and culture if amenable  Follow blood cultures for sensitivities   Follow repeat blood cultures - 1 set sent, 2nd set ordered   Cont Pip-tazo  AC per primary team  Serial abd exams  Trend temps and cbc      Roxanna Brand M.D.  [facility], Division of Infectious Diseases  [phone]  After 5pm on weekdays and all day on weekends - please call [phone]

## 2022-01-31 NOTE — DISCHARGE NOTE PROVIDER - NSDCQMERRANDS_GEN_ALL_CORE
PATIENT IS ACTIVE WITH [facility]. ORDERS RECEIVED TO [facility]. MET WITH PATIENT, PRIOR TO DISCHARGE TO DISCUSS PLANS. PATIENT HAS BEEN GIVING IV CEFAZOLIN AT HOME AND WILL CONTINUE ADMINISTERING. SHE WILL GIVE PM DOSE TONIGHT.  PATIENT No

## 2022-01-31 NOTE — DISCHARGE NOTE PROVIDER - NPI NUMBER (FOR SYSADMIN USE ONLY) :
[[id number] [[id number]] [[id number]],[[id number]],[UNKNOWN]

## 2022-01-31 NOTE — PROGRESS NOTE ADULT - SUBJECTIVE AND OBJECTIVE BOX
SURGERY  Spectralink [id number]    Pt seen and examined. Denies any overnight events. Denies any nausea/vomiting or abdominal pain. Reports having normal BM this am. Denies any fevers/chills. Pt reports ambulating with assistance. Tolerating regular diet yesterday and tolerating Heparin drip without adverse reactions.    ICU Vital Signs Last 24 Hrs  T(C): 36.4 (31 Jan 2022 05:35), Max: 36.8 (30 Jan 2022 14:15)  T(F): 97.5 (31 Jan 2022 05:35), Max: 98.3 (30 Jan 2022 14:15)  HR: 93 (31 Jan 2022 05:35) (93 - 104)  BP: 123/74 (31 Jan 2022 05:35) (110/70 - 123/74)  BP(mean): --  ABP: --  ABP(mean): --  RR: 18 (31 Jan 2022 05:35) (18 - 18)  SpO2: 94% (31 Jan 2022 05:35) (94% - 94%)      I&O's Detail    30 Jan 2022 07:01  -  31 Jan 2022 07:00  --------------------------------------------------------  IN:    Heparin Infusion: 216 mL  Total IN: 216 mL    OUT:  Total OUT: 0 mL    Total NET: 216 mL    Physical exam: Pt sitting comfortably in bed in NAD  Chest- CTA bilaterally   CV- S1 & S2, RRR  Abdomen- Soft, non-tender, non-distended. ( + ) BS, no ttp to RUQ or epigastric region.    LABS:                        9.3    29.22 )-----------( 512      ( 31 Jan 2022 09:08 )             28.3     01-31    137  |  107  |  14  ----------------------------<  102<H>  3.5   |  24  |  1.10    Ca    8.4<L>      31 Jan 2022 09:08    TPro  6.3  /  Alb  1.6<L>  /  TBili  0.4  /  DBili  x   /  AST  17  /  ALT  22  /  AlkPhos  144<H>  01-30    PT/INR - ( 31 Jan 2022 10:16 )   PT: 15.0 sec;   INR: 1.30 ratio         PTT - ( 31 Jan 2022 10:16 )  PTT:28.9 sec      Culture - Urine (collected 28 Jan 2022 19:03)  Source: Clean Catch Clean Catch (Midstream)  Final Report (29 Jan 2022 14:35):    <10,000 CFU/mL Normal Urogenital Kera    Culture - Blood (collected 28 Jan 2022 18:52)  Source: .Blood Blood-Peripheral  Gram Stain (30 Jan 2022 17:37):    Growth in anaerobic bottle: Gram Positive Cocci in Pairs and Chains    Growth in aerobic bottle: Gram positive cocci in pairs  Preliminary Report (31 Jan 2022 11:03):    Growth in anaerobic bottle: Streptococcus intermedius Susceptibility to    follow.    Growth in aerobic bottle: Gram positive cocci in pairs    ***Blood Panel PCR results on this specimen are available    approximately 3 hours after the Gram stain result.***    Gram stain, PCR, and/or culture results may not always    correspond due to difference in methodologies.    ************************************************************    This PCR assay was performed by multiplex PCR. This    Assay tests for 66 bacterialand resistance gene targets.    Please refer to the [facility] Labs test directory    at [email or web address] for details.  Organism: Blood Culture PCR (30 Jan 2022 12:49)  Organism: Blood Culture PCR (30 Jan 2022 12:49)    Culture - Blood (collected 28 Jan 2022 18:52)  Source: .Blood Blood-Peripheral  Gram Stain (30 Jan 2022 17:42):    Growth in aerobic bottle: Gram Positive Cocci in Pairs and Chains    Growth in anaerobic bottle: Gram Positive Cocci in Pairs and Chains  Preliminary Report (30 Jan 2022 17:43):    Growth in aerobic bottle: Gram Positive Cocci in Pairs and Chains    Growth in anaerobic bottle: Gram Positive Cocci in Pairs and Chains      65 yo male PMH CLL (not undergoing treatment), HTN, BPH presents to ED after outpatient CT scan showed multiple hepatic abscess, sent by H/O Dr. Tubbs- on IV Zosyn    -Pt with clinical improvement with decreased in leukocytosis, pt reports less fatigue /no fevers/chills  -Findings of CT discussed with IR this am, location of abscess is high in the segment of liver and small in size, likely not easily amenable to IR drainage, pt may require interval imaging after additional days of antibiotics for comparison prior to attempting drainage. CT A/P reordered per medicine to evaluate adrenal mass  -Continue IV Heparin drip, plan for transition to oral AC once definite plan in place for IR drainage vs. watchful waiting.  -IV Zosyn per ID, duration of antibiotic, course to be determined, GPC in pair on blood cultures x 2 from 1/28, repeat blood cultre  -Diet per primary team.  -Will follow  -Above discussed with Dr. Carey    -Continue DVT Prophylaxis with SCD's  -Continue IV Antibiotics  -Continue Incentive Spiromtery  -Continue analgesia  -Encourage OOB/ambulation with assistance  -Monitor bowel function  -Above discussed with    SURGERY  Spectralink [id number]    Pt seen and examined. Denies any overnight events. Denies any nausea/vomiting or abdominal pain. Reports having normal BM this am. Denies any fevers/chills. Pt reports ambulating with assistance. Tolerating regular diet yesterday and tolerating Heparin drip without adverse reactions.    ICU Vital Signs Last 24 Hrs  T(C): 36.4 (31 Jan 2022 05:35), Max: 36.8 (30 Jan 2022 14:15)  T(F): 97.5 (31 Jan 2022 05:35), Max: 98.3 (30 Jan 2022 14:15)  HR: 93 (31 Jan 2022 05:35) (93 - 104)  BP: 123/74 (31 Jan 2022 05:35) (110/70 - 123/74)  BP(mean): --  ABP: --  ABP(mean): --  RR: 18 (31 Jan 2022 05:35) (18 - 18)  SpO2: 94% (31 Jan 2022 05:35) (94% - 94%)      I&O's Detail    30 Jan 2022 07:01  -  31 Jan 2022 07:00  --------------------------------------------------------  IN:    Heparin Infusion: 216 mL  Total IN: 216 mL    OUT:  Total OUT: 0 mL    Total NET: 216 mL    Physical exam: Pt sitting comfortably in bed in NAD  Chest- CTA bilaterally   CV- S1 & S2, RRR  Abdomen- Soft, non-tender, non-distended. ( + ) BS, no ttp to RUQ or epigastric region.    LABS:                        9.3    29.22 )-----------( 512      ( 31 Jan 2022 09:08 )             28.3     01-31    137  |  107  |  14  ----------------------------<  102<H>  3.5   |  24  |  1.10    Ca    8.4<L>      31 Jan 2022 09:08    TPro  6.3  /  Alb  1.6<L>  /  TBili  0.4  /  DBili  x   /  AST  17  /  ALT  22  /  AlkPhos  144<H>  01-30    PT/INR - ( 31 Jan 2022 10:16 )   PT: 15.0 sec;   INR: 1.30 ratio         PTT - ( 31 Jan 2022 10:16 )  PTT:28.9 sec      Culture - Urine (collected 28 Jan 2022 19:03)  Source: Clean Catch Clean Catch (Midstream)  Final Report (29 Jan 2022 14:35):    <10,000 CFU/mL Normal Urogenital Kera    Culture - Blood (collected 28 Jan 2022 18:52)  Source: .Blood Blood-Peripheral  Gram Stain (30 Jan 2022 17:37):    Growth in anaerobic bottle: Gram Positive Cocci in Pairs and Chains    Growth in aerobic bottle: Gram positive cocci in pairs  Preliminary Report (31 Jan 2022 11:03):    Growth in anaerobic bottle: Streptococcus intermedius Susceptibility to    follow.    Growth in aerobic bottle: Gram positive cocci in pairs    ***Blood Panel PCR results on this specimen are available    approximately 3 hours after the Gram stain result.***    Gram stain, PCR, and/or culture results may not always    correspond due to difference in methodologies.    ************************************************************    This PCR assay was performed by multiplex PCR. This    Assay tests for 66 bacterialand resistance gene targets.    Please refer to the [facility] Labs test directory    at [email or web address] for details.  Organism: Blood Culture PCR (30 Jan 2022 12:49)  Organism: Blood Culture PCR (30 Jan 2022 12:49)    Culture - Blood (collected 28 Jan 2022 18:52)  Source: .Blood Blood-Peripheral  Gram Stain (30 Jan 2022 17:42):    Growth in aerobic bottle: Gram Positive Cocci in Pairs and Chains    Growth in anaerobic bottle: Gram Positive Cocci in Pairs and Chains  Preliminary Report (30 Jan 2022 17:43):    Growth in aerobic bottle: Gram Positive Cocci in Pairs and Chains    Growth in anaerobic bottle: Gram Positive Cocci in Pairs and Chains      65 yo male PMH CLL (not undergoing treatment), HTN, BPH presents to ED after outpatient CT scan showed multiple hepatic abscess, sent by H/O Dr. Tubbs- on IV Zosyn    -Pt with clinical improvement with decreased in leukocytosis, pt reports less fatigue /no fevers/chills  -Findings of CT discussed with IR this am, location of abscess is high in the segment of liver and small in size, likely not easily amenable to IR drainage, pt may require interval imaging after additional days of antibiotics for comparison prior to attempting drainage. CT A/P reordered per medicine to evaluate adrenal mass  -Continue IV Heparin drip, plan for transition to oral AC once definite plan in place for IR drainage vs. watchful waiting.  -IV Zosyn per ID, duration of antibiotic, course to be determined, GPC in pair on blood cultures x 2 from 1/28, repeat blood cultre  -Diet per primary team.  -Will follow  -Above discussed with Dr. Carey

## 2022-01-31 NOTE — PROGRESS NOTE ADULT - PROBLEM SELECTOR PLAN 4
Na 132, K 3.1 on admission  -Hyponatremia likely due to low solute intake over 1 month  -S/p 2L bolus in ED, start 60cc NS   -Potassium tab x2  -Monitor daily BMP Na 132, K 3.1 on admission  - Hyponatremia likely due to low solute intake over 1 month  - S/p 2L bolus in ED, start 60cc NS   - Potassium tab x2  - Monitor daily BMP  - now resolved

## 2022-01-31 NOTE — DISCHARGE NOTE PROVIDER - PROVIDER TOKENS
PROVIDER:[TOKEN:[[id number]],FOLLOWUP:[1 week]],PROVIDER:[TOKEN:[[id number]],FOLLOWUP:[1 week]],PROVIDER:[TOKEN:[[id number]],FOLLOWUP:[1 week]] PROVIDER:[TOKEN:[[id number]],FOLLOWUP:[1 week]],PROVIDER:[TOKEN:[[id number]],FOLLOWUP:[1 week]],PROVIDER:[TOKEN:[[id number]],FOLLOWUP:[1 week]],PROVIDER:[TOKEN:[[id number]],FOLLOWUP:[1 week]] PROVIDER:[TOKEN:[[id number]],FOLLOWUP:[1 week]],PROVIDER:[TOKEN:[[id number]],FOLLOWUP:[1 week]],FREE:[LAST:[zee],FIRST:[DANNY],PHONE:[(   )    -],FAX:[(   )    -],ADDRESS:[[facility] / ]]

## 2022-02-01 LAB
-  CEFTRIAXONE: SIGNIFICANT CHANGE UP
-  CLINDAMYCIN: SIGNIFICANT CHANGE UP
-  ERYTHROMYCIN: SIGNIFICANT CHANGE UP
-  LEVOFLOXACIN: SIGNIFICANT CHANGE UP
-  PENICILLIN: SIGNIFICANT CHANGE UP
-  VANCOMYCIN: SIGNIFICANT CHANGE UP
ALBUMIN SERPL ELPH-MCNC: 1.7 G/DL — LOW (ref 3.3–5)
ALP SERPL-CCNC: 168 U/L — HIGH (ref 40–120)
ALT FLD-CCNC: 21 U/L — SIGNIFICANT CHANGE UP (ref 12–78)
ANION GAP SERPL CALC-SCNC: 8 MMOL/L — SIGNIFICANT CHANGE UP (ref 5–17)
AST SERPL-CCNC: 23 U/L — SIGNIFICANT CHANGE UP (ref 15–37)
BASOPHILS # BLD AUTO: 0 K/UL — SIGNIFICANT CHANGE UP (ref 0–0.2)
BASOPHILS NFR BLD AUTO: 0 % — SIGNIFICANT CHANGE UP (ref 0–2)
BILIRUB SERPL-MCNC: 0.3 MG/DL — SIGNIFICANT CHANGE UP (ref 0.2–1.2)
BUN SERPL-MCNC: 11 MG/DL — SIGNIFICANT CHANGE UP (ref 7–23)
CALCIUM SERPL-MCNC: 8.4 MG/DL — LOW (ref 8.5–10.1)
CHLORIDE SERPL-SCNC: 108 MMOL/L — SIGNIFICANT CHANGE UP (ref 96–108)
CO2 SERPL-SCNC: 22 MMOL/L — SIGNIFICANT CHANGE UP (ref 22–31)
CREAT SERPL-MCNC: 1.1 MG/DL — SIGNIFICANT CHANGE UP (ref 0.5–1.3)
CULTURE RESULTS: SIGNIFICANT CHANGE UP
CULTURE RESULTS: SIGNIFICANT CHANGE UP
EOSINOPHIL # BLD AUTO: 0 K/UL — SIGNIFICANT CHANGE UP (ref 0–0.5)
EOSINOPHIL NFR BLD AUTO: 0 % — SIGNIFICANT CHANGE UP (ref 0–6)
GLUCOSE SERPL-MCNC: 95 MG/DL — SIGNIFICANT CHANGE UP (ref 70–99)
HCT VFR BLD CALC: 27.6 % — LOW (ref 39–50)
HGB BLD-MCNC: 8.6 G/DL — LOW (ref 13–17)
LYMPHOCYTES # BLD AUTO: 15.94 K/UL — HIGH (ref 1–3.3)
LYMPHOCYTES # BLD AUTO: 60 % — HIGH (ref 13–44)
MCHC RBC-ENTMCNC: 29.1 PG — SIGNIFICANT CHANGE UP (ref 27–34)
MCHC RBC-ENTMCNC: 31.2 GM/DL — LOW (ref 32–36)
MCV RBC AUTO: 93.2 FL — SIGNIFICANT CHANGE UP (ref 80–100)
METHOD TYPE: SIGNIFICANT CHANGE UP
METHOD TYPE: SIGNIFICANT CHANGE UP
MONOCYTES # BLD AUTO: 0 K/UL — SIGNIFICANT CHANGE UP (ref 0–0.9)
MONOCYTES NFR BLD AUTO: 0 % — LOW (ref 2–14)
NEUTROPHILS # BLD AUTO: 10.36 K/UL — HIGH (ref 1.8–7.4)
NEUTROPHILS NFR BLD AUTO: 39 % — LOW (ref 43–77)
NRBC # BLD: SIGNIFICANT CHANGE UP /100 WBCS (ref 0–0)
ORGANISM # SPEC MICROSCOPIC CNT: SIGNIFICANT CHANGE UP
PLATELET # BLD AUTO: 494 K/UL — HIGH (ref 150–400)
POTASSIUM SERPL-MCNC: 3.9 MMOL/L — SIGNIFICANT CHANGE UP (ref 3.5–5.3)
POTASSIUM SERPL-SCNC: 3.9 MMOL/L — SIGNIFICANT CHANGE UP (ref 3.5–5.3)
PROT SERPL-MCNC: 6.6 G/DL — SIGNIFICANT CHANGE UP (ref 6–8.3)
RBC # BLD: 2.96 M/UL — LOW (ref 4.2–5.8)
RBC # FLD: 14 % — SIGNIFICANT CHANGE UP (ref 10.3–14.5)
SODIUM SERPL-SCNC: 138 MMOL/L — SIGNIFICANT CHANGE UP (ref 135–145)
SPECIMEN SOURCE: SIGNIFICANT CHANGE UP
SPECIMEN SOURCE: SIGNIFICANT CHANGE UP
WBC # BLD: 26.56 K/UL — HIGH (ref 3.8–10.5)
WBC # FLD AUTO: 26.56 K/UL — HIGH (ref 3.8–10.5)

## 2022-02-01 PROCEDURE — 99233 SBSQ HOSP IP/OBS HIGH 50: CPT

## 2022-02-01 RX ORDER — METRONIDAZOLE 500 MG
500 TABLET ORAL EVERY 8 HOURS
Refills: 0 | Status: DISCONTINUED | OUTPATIENT
Start: 2022-02-01 | End: 2022-02-02

## 2022-02-01 RX ADMIN — Medication 500 MILLIGRAM(S): at 14:03

## 2022-02-01 RX ADMIN — APIXABAN 5 MILLIGRAM(S): 2.5 TABLET, FILM COATED ORAL at 05:42

## 2022-02-01 RX ADMIN — Medication 500 MILLIGRAM(S): at 21:26

## 2022-02-01 RX ADMIN — TAMSULOSIN HYDROCHLORIDE 0.4 MILLIGRAM(S): 0.4 CAPSULE ORAL at 21:26

## 2022-02-01 RX ADMIN — Medication 1 TABLET(S): at 08:49

## 2022-02-01 RX ADMIN — APIXABAN 5 MILLIGRAM(S): 2.5 TABLET, FILM COATED ORAL at 17:43

## 2022-02-01 RX ADMIN — Medication 1 APPLICATION(S): at 13:24

## 2022-02-01 RX ADMIN — PIPERACILLIN AND TAZOBACTAM 25 GRAM(S): 4; .5 INJECTION, POWDER, LYOPHILIZED, FOR SOLUTION INTRAVENOUS at 05:42

## 2022-02-01 RX ADMIN — Medication 1 TABLET(S): at 17:43

## 2022-02-01 RX ADMIN — PANTOPRAZOLE SODIUM 40 MILLIGRAM(S): 20 TABLET, DELAYED RELEASE ORAL at 05:42

## 2022-02-01 NOTE — PROGRESS NOTE ADULT - PROBLEM SELECTOR PLAN 6
GFR 58 on admission, GFR 60-70 since June 2019 per chart review, likely CKD stage 2  - No signs of SABRINA, Cr 1.3 on admission, Cr 1.2 in Oct 202  - Monitor renal indices closely, Cr WNL and stable

## 2022-02-01 NOTE — PROGRESS NOTE ADULT - ASSESSMENT
67 YO Male w/ PMHx of CLL (not undergoing treatment), HTN, BPH presents to ED after outpatient CT scan showed multiple hepatic abscess, sent by H/O Dr. Tubbs. Leukocytosis improving

## 2022-02-01 NOTE — PROGRESS NOTE ADULT - PROBLEM SELECTOR PLAN 5
H/H 9.5/28.4on admission, unknown baseline  - Monitor closely for signs of bleeding given patient to be started on heparin gtt  - iron studies noted  - Hgb stable, patient with no gross bleeding or anemia sx   - Monitor daily CBC  - Follows with heme Dr. Tubbs outpatient for CLL

## 2022-02-01 NOTE — PROGRESS NOTE ADULT - ASSESSMENT
65 yo male with pmhx of CLL diverticulitis GERD and HTN sent in by Dr. Tubbs for liver abscess seen on out pt CT.    intraabdominal abscess  thrombosis of the right hepatic vein consistent with septic thrombophlebitis  CLL  hx of Diverticular Disease of Intestine  Pleural eff - Atelectasis  Adrenal nodule    MRI noted - confirmed CT findings - on ABX - on Hep Ac -   HEME onc eval noted  Blood cx noted - positive  IR drainage deferred - location - risk for complications - poss repeat imaging while on ABX therapy     I darryl  DVT p = ac = for thrombosis  emp ABX - ID eval - cx and biomarkers -   will consider - discuss with IR - Pleural eff drainage - likely reactive from the process of Abscess - Hepatic -   CLL management - Onc following  CT abd chest reviewed from outpatient  monitor VS and HD and Sat - keep sat > 88 pct  Surgery Eval noted  cvs rx regimen and BP control  Replete Lytes -

## 2022-02-01 NOTE — PROGRESS NOTE ADULT - PROBLEM SELECTOR PLAN 1
Outpatient CT scan reports showing findings most suggestive of multiple abscesses in the right hepatic lobe with associated thrombosis of the right hepatic vein consistent with septic thrombophlebitis  - Positive blood cx strep anginosus; negative urine cx  - f/u repeat blood cx drawn 1/31   - IR drainage 1/31 for liver abscess: Plan d/w Surgery Dr. Carey and GI, no plan for IR drainage of the abscess, since high risk of complications due to location. Will continue antibiotics and transition from heparin drip to Eliquis since no plan for any surgical intervention or IR procedure. (Hematology recommended Eliquis 5mg BID x 3 months)  - As per ID; can discontinue pip-tazo and Start on Levofloxacin 750mg PO QD and Metronidazole 500mg PO Q8h  - Treat for at least 4 weeks from negative blood cultures with follow up CTAP to reassess abscess before discontinuing antibiotics   - GI ok to continue diet. NPO for procedures.  - MRCP results noted  - GI Dr. Acosta following   - ID Dr. Kayce Zhao following   - Surg Dr. Carey following Outpatient CT scan reports showing findings most suggestive of multiple abscesses in the right hepatic lobe with associated thrombosis of the right hepatic vein consistent with septic thrombophlebitis  - Positive blood cx strep anginosus; negative urine cx  - f/u repeat blood cx drawn 1/31 NGTD, if continued negative tomorrow can DC  - IR drainage 1/31 for liver abscess: Plan d/w Surgery Dr. Carey and GI, no plan for IR drainage of the abscess, since high risk of complications due to location. Will continue antibiotics and transition from heparin drip to Eliquis since no plan for any surgical intervention or IR procedure. (Hematology recommended Eliquis 5mg BID x 3 months)  - As per ID; can discontinue pip-tazo and Start on Levofloxacin 750mg PO QD and Metronidazole 500mg PO Q8h  - Treat for at least 4 weeks from negative blood cultures with follow up CTAP to reassess abscess before discontinuing antibiotics   - GI ok to continue diet. NPO for procedures.  - MRCP results noted  - GI Dr. Acosta following   - ID Dr. Kayce Zhao following   - Surg Dr. Carey following

## 2022-02-01 NOTE — PROGRESS NOTE ADULT - ASSESSMENT
liver abscess  diverticulitis  abdominal pain    regular diet  antibiotics per ID  ID eval  cultures noted  ? septic thrombophlebitis  cont hep gtt  mri noted  case d/w IR; high risk for drainage given location and need for a/c  given clinical response to antibiotics will hold off  patient will need repeat imaging in 3-4 weeks  he prefers to follow up with his primary GI; dr. pearson   dc planning    Advanced care planning was discussed with patient and family.  Advanced care planning forms were reviewed and discussed.  Risks, benefits and alternatives of gastroenterologic procedures were discussed in detail and all questions were answered.    30 minutes spent.

## 2022-02-01 NOTE — PROGRESS NOTE ADULT - SUBJECTIVE AND OBJECTIVE BOX
SUBJECTIVE: Patient seen and examined at bedside. Patient states that he feels well, tolerating regular diet w/o N/V.    Vital Signs Last 24 Hrs  T(C): 36.6 (01 Feb 2022 14:31), Max: 36.6 (31 Jan 2022 19:58)  T(F): 97.9 (01 Feb 2022 14:31), Max: 97.9 (01 Feb 2022 14:31)  HR: 102 (01 Feb 2022 14:31) (92 - 104)  BP: 141/82 (01 Feb 2022 14:31) (122/75 - 141/82)  BP(mean): --  RR: 17 (01 Feb 2022 14:31) (17 - 18)  SpO2: 95% (01 Feb 2022 14:31) (93% - 95%)    PHYSICAL EXAM:  GENERAL: No acute distress, well-developed  HEAD:  Atraumatic, Normocephalic  ABDOMEN: Soft, non-tender, non-distended  NEUROLOGY: A&O x 3, no focal deficits    I&O's Summary    MEDICATIONS  (STANDING):  apixaban 5 milliGRAM(s) Oral every 12 hours  BACItracin   Ointment 1 Application(s) Topical daily  lactobacillus acidophilus 1 Tablet(s) Oral two times a day with meals  levoFLOXacin  Tablet 750 milliGRAM(s) Oral every 24 hours  metroNIDAZOLE    Tablet 500 milliGRAM(s) Oral every 8 hours  pantoprazole    Tablet 40 milliGRAM(s) Oral before breakfast  tamsulosin 0.4 milliGRAM(s) Oral at bedtime    MEDICATIONS  (PRN):  ondansetron Injectable 4 milliGRAM(s) IV Push every 8 hours PRN Nausea and/or Vomiting    LABS:                        8.6    26.56 )-----------( 494      ( 01 Feb 2022 09:37 )             27.6     02-01    138  |  108  |  11  ----------------------------<  95  3.9   |  22  |  1.10    Ca    8.4<L>      01 Feb 2022 09:37    TPro  6.6  /  Alb  1.7<L>  /  TBili  0.3  /  DBili  x   /  AST  23  /  ALT  21  /  AlkPhos  168<H>  02-01    PT/INR - ( 31 Jan 2022 10:16 )   PT: 15.0 sec;   INR: 1.30 ratio      PTT - ( 31 Jan 2022 10:16 )  PTT:28.9 sec

## 2022-02-01 NOTE — PROGRESS NOTE ADULT - PROBLEM SELECTOR PLAN 1
- No IR drainage due to location of abscess. Will likely need colectomy as outpatient   - Cont PO Levaquin & Flagyl as per ID  - Transitioned to Eliquis per medicine  - pain control, supportive care, OOB, incentive spirometer  - Reg diet  - Case discussed with Dr. Carey    Surgical Team Contact Information  Spectralink: Ext: [phone] or [phone]  Pager: [phone]

## 2022-02-01 NOTE — PROGRESS NOTE ADULT - SUBJECTIVE AND OBJECTIVE BOX
[facility]  Ray Coats PA-C  [address] [place] [address]  [phone]      INTERVAL HPI/OVERNIGHT EVENTS:  Seen and examined, chart reviewed  reports improvements in diarrhea  offers no further GI complaints            MEDICATIONS  (STANDING):  heparin  Infusion. 1600 Unit(s)/Hr (16 mL/Hr) IV Continuous <Continuous>  lactobacillus acidophilus 1 Tablet(s) Oral two times a day with meals  pantoprazole    Tablet 40 milliGRAM(s) Oral before breakfast  piperacillin/tazobactam IVPB.. 3.375 Gram(s) IV Intermittent every 8 hours  tamsulosin 0.4 milliGRAM(s) Oral at bedtime    MEDICATIONS  (PRN):  heparin   Injectable 6500 Unit(s) IV Push every 6 hours PRN For aPTT less than 40  heparin   Injectable 3000 Unit(s) IV Push every 6 hours PRN For aPTT between 40 - 57  ondansetron Injectable 4 milliGRAM(s) IV Push every 8 hours PRN Nausea and/or Vomiting      Allergies    No Known Allergies    Intolerances            ROS:   General:  No wt loss, fevers, chills, night sweats, fatigue,   Eyes:  Good vision, no reported pain  ENT:  No sore throat, pain, runny nose, dysphagia  CV:  No pain, palpitations, hypo/hypertension  Resp:  No dyspnea, cough, tachypnea, wheezing  GI:  Pos diarrhea No pain, No nausea, No vomiting,, No constipation, No weight loss, No fever, No pruritis, No rectal bleeding, No tarry stools, No dysphagia,  :  No pain, bleeding, incontinence, nocturia  Muscle:  No pain, weakness  Neuro:  No weakness, tingling, memory problems  Psych:  No fatigue, insomnia, mood problems, depression  Endocrine:  No polyuria, polydipsia, cold/heat intolerance  Heme:  No petechiae, ecchymosis, easy bruisability  Skin:  No rash, tattoos, scars, edema      PHYSICAL EXAM  Vital Signs Last 24 Hrs  T(C): 37.2 (29 Jan 2022 20:46), Max: 37.7 (29 Jan 2022 13:58)  T(F): 98.9 (29 Jan 2022 20:46), Max: 99.8 (29 Jan 2022 13:58)  HR: 102 (29 Jan 2022 20:46) (102 - 102)  BP: 110/69 (29 Jan 2022 20:46) (104/62 - 110/69)  BP(mean): --  RR: 18 (29 Jan 2022 20:46) (18 - 18)  SpO2: 92% (29 Jan 2022 20:46) (92% - 95%)          GENERAL:  Appears stated age,   HEENT:  NC/AT,    CHEST:  Full & symmetric excursion,   HEART:  Regular rhythm,  ABDOMEN:  Soft, non-tender, non-distended,  EXTEREMITIES:  no cyanosis  SKIN:  No rash  NEURO:  Alert,       LABS:                        8.4    30.37 )-----------( 443      ( 30 Jan 2022 08:42 )             25.5     01-30    138  |  109<H>  |  16  ----------------------------<  115<H>  3.7   |  24  |  1.20    Ca    8.2<L>      30 Jan 2022 08:42  Phos  3.1     01-29  Mg     2.0     01-29    TPro  6.3  /  Alb  1.6<L>  /  TBili  0.4  /  DBili  x   /  AST  17  /  ALT  22  /  AlkPhos  144<H>  01-30    PT/INR - ( 28 Jan 2022 14:12 )   PT: 17.4 sec;   INR: 1.52 ratio         PTT - ( 29 Jan 2022 22:43 )  PTT:59.8 sec      01-29-22 @ 07:01  -  01-30-22 @ 07:00  --------------------------------------------------------  IN: 364 mL / OUT: 400 mL / NET: -36 mL                Culture - Urine (collected 28 Jan 2022 19:03)  Source: Clean Catch Clean Catch (Midstream)  Final Report (29 Jan 2022 14:35):    <10,000 CFU/mL Normal Urogenital Kera    Culture - Blood (collected 28 Jan 2022 18:52)  Source: .Blood Blood-Peripheral  Gram Stain (30 Jan 2022 10:56):    Growth in anaerobic bottle: Gram Positive Cocci in Pairs and Chains  Preliminary Report (30 Jan 2022 10:56):    Growth in anaerobic bottle: Gram Positive Cocci in Pairs and Chains    ***Blood Panel PCR results on this specimen are available    approximately 3 hours after the Gram stain result.***    Gram stain, PCR, and/or culture results may not always    correspond due to difference in methodologies.    ************************************************************    This PCR assay was performed by multiplex PCR. This    Assay tests for 66 bacterial and resistance gene targets.    Please refer to the [facility] test directory    at [email or web address] for details.    Culture - Blood (collected 28 Jan 2022 18:52)  Source: .Blood Blood-Peripheral  Preliminary Report (29 Jan 2022 19:02):    No growth to date.        RADIOLOGY & ADDITIONAL TESTS:

## 2022-02-01 NOTE — PROGRESS NOTE ADULT - ASSESSMENT
Patient is a 66 year old male with PMH of CLL, HTN, BPH hx diverticulitis ( last flare 18 months ago ) admitted with   Strep bacteremia secondary to liver abscesses likely due to Acute diverticulitis   -- Strep intermedius sensitivities reviewed, pansensitive   +thrombosis of the right hepatic vein consistent with septic thrombophlebitis  Pleural effusion and atelectasis likely in setting of abscess - doubt pneumonia, Pulm following   Wbc downtrended - Pt has CLL thus not good marker for infectious process  Given location and size of liver abscess not amenable to IR drainage  1/31 CTAP shows mildly decreased multifocal liver abscesses    Recommendations:   Follow repeat blood cultures sent 1/31  Discontinue pip-tazo  Start on Levofloxacin 750mg PO QD (Qtc ok) and Metronidazole 500mg PO Q8h  Will plan to treat for at least 4 weeks from negative blood cultures with follow up CTAP to reassess before discontinuing antibiotics   AC per primary team  Trend temps and cbc      D/w Dr. Lara Brand M.D.  [facility], Division of Infectious Diseases  [phone]  After 5pm on weekdays and all day on weekends - please call [phone]

## 2022-02-01 NOTE — PROGRESS NOTE ADULT - SUBJECTIVE AND OBJECTIVE BOX
[facility], Division of Infectious Diseases  MELE Damon Y. Patel, S. Shah, G. Casimir  [phone]  ([phone] - weekdays after 5pm and weekends)    Name: SAMI MILES  Age/Gender: 66y Male  MRN: [id number]    Interval History:  Patient seen this morning, feels well.   No fever, n/v/d, pain or new complaints.   Notes reviewed  No concerning overnight events  Afebrile     Allergies: No Known Allergies    Objective:  Vitals:   T(F): 97.8 (02-01-22 @ 04:40), Max: 97.8 (01-31-22 @ 19:58)  HR: 92 (02-01-22 @ 04:40) (92 - 114)  BP: 122/75 (02-01-22 @ 04:40) (122/75 - 134/76)  RR: 18 (02-01-22 @ 04:40) (18 - 18)  SpO2: 93% (02-01-22 @ 04:40) (93% - 97%)  Physical Examination:  General: no acute distress  HEENT: NC/AT, anicteric, neck supple  Respiratory: no acc muscle use, breathing comfortably  Cardiovascular: S1 and S2 present  Gastrointestinal: soft, nontender, nondistended  Extremities: no edema, no cyanosis  Skin: no visible rash    Laboratory Studies:  CBC:                       9.3    29.22 )-----------( 512      ( 31 Jan 2022 09:08 )             28.3     WBC Trend:  29.22 01-31-22 @ 09:08  30.37 01-30-22 @ 08:42  37.21 01-29-22 @ 15:13  34.86 01-29-22 @ 08:08  37.35 01-29-22 @ 00:41  44.77 01-28-22 @ 14:12    CMP: 01-31    137  |  107  |  14  ----------------------------<  102<H>  3.5   |  24  |  1.10    Ca    8.4<L>      31 Jan 2022 09:08    Microbiology: reviewed   Culture - Urine (collected 01-28-22 @ 19:03)  Source: Clean Catch Clean Catch (Midstream)  Final Report (01-29-22 @ 14:35):    <10,000 CFU/mL Normal Urogenital Kera    Culture - Blood (collected 01-28-22 @ 18:52)  Source: .Blood Blood-Peripheral  Gram Stain (01-30-22 @ 17:37):    Growth in anaerobic bottle: Gram Positive Cocci in Pairs and Chains    Growth in aerobic bottle: Gram positive cocci in pairs  Final Report (02-01-22 @ 08:24):    Growth in aerobic and anaerobic bottles: Streptococcus intermedius    ***Blood Panel PCR results on this specimen are available    approximately 3 hours after the Gram stain result.***    Gram stain, PCR, and/or culture results may not always    corresponddue to difference in methodologies.    ************************************************************    This PCR assay was performed by multiplex PCR. This    Assay tests for 66 bacterial and resistance gene targets.    Please refer to the [facility] test directory    at [email or web address] for details.  Organism: Blood Culture PCR  Streptococcus intermedius (02-01-22 @ 08:24)  Organism: Streptococcus intermedius (02-01-22 @ 08:24)      -  Ceftriaxone: S 0.19      -  Penicillin: S 0.016      Method Type: ETEST  Organism: Streptococcus intermedius (02-01-22 @ 08:24)      -  Clindamycin: S      -  Erythromycin: S      -  Levofloxacin: S      -  Vancomycin: S      Method Type: KB  Organism: Blood Culture PCR (02-01-22 @ 08:24)      -  Streptococcus anginosus group: Detec      Method Type: PCR    Culture - Blood (collected 01-28-22 @ 18:52)  Source: .Blood Blood-Peripheral  Gram Stain (01-30-22 @ 17:42):    Growth in aerobic bottle: Gram Positive Cocci in Pairs and Chains    Growth in anaerobic bottle: Gram Positive Cocci in Pairs and Chains  Final Report (02-01-22 @ 08:24):    Growth in aerobic and anaerobic bottles: Streptococcus intermedius See    previous culture [id number]    Radiology: reviewed   CT Abdomen and Pelvis w/wo IV Cont (01.31.22 @ 12:16) >IMPRESSION: 13 mm left adrenal nodule compatible with either a complex cyst or small hematoma. Multifocal liver abscesses are mildly decreased in size. Sigmoid diverticulosis andmild pericolic inflammation suggestive of acute diverticulitis.    MR MRCP w/wo IV Cont (01.28.22 @ 19:55) >IMPRESSION: Multifocal liver abscesses of segments 7, 8 and 4A/B with associated hepatic vein septic thrombophlebitis. Additional developing abscesses/phlegmonous change of segments 2/3, 5, and 6. Findings of diverticulitis seen on the recent CT are not imaged on this study. Moderate right pleural effusion. Indeterminate 1 cm left adrenal nodule, is suboptimally assessed due to small size and motion artifact. Correlate with adrenal protocol abdominal CT.    Medications:  apixaban 5 milliGRAM(s) Oral every 12 hours  BACItracin   Ointment 1 Application(s) Topical daily  lactobacillus acidophilus 1 Tablet(s) Oral two times a day with meals  ondansetron Injectable 4 milliGRAM(s) IV Push every 8 hours PRN  pantoprazole    Tablet 40 milliGRAM(s) Oral before breakfast  piperacillin/tazobactam IVPB.. 3.375 Gram(s) IV Intermittent every 8 hours  tamsulosin 0.4 milliGRAM(s) Oral at bedtime    Antimicrobials:  piperacillin/tazobactam IVPB.. 3.375 Gram(s) IV Intermittent every 8 hours

## 2022-02-01 NOTE — PROGRESS NOTE ADULT - ATTENDING COMMENTS
65 yo male PMH CLL (not undergoing treatment), HTN, BPH presents to ED after outpatient CT scan showed multiple hepatic abscess, also with fatigue, rigors, fevers, myalgias, poor PO intake for past 1 month, admitted with sepsis 2/2 hepatic abscesses and thrombophlebitis of right hepatic vein.  - Positive blood cx strep anginosus; negative urine cx  - f/u repeat blood cx drawn 1/31 NGTD, if continued negative tomorrow can DC  - IR drainage 1/31 for liver abscess: Plan d/w Surgery Dr. Carey and GI, no plan for IR drainage of the abscess, since high risk of complications due to location. Will continue antibiotics and transition from heparin drip to Eliquis since no plan for any surgical intervention or IR procedure. (Hematology recommended Eliquis 5mg BID x 3 months)  - As per ID; can discontinue pip-tazo and Start on Levofloxacin 750mg PO QD and Metronidazole 500mg PO Q8h  - Treat for at least 4 weeks from negative blood cultures with follow up CTAP to reassess abscess before discontinuing antibiotics   - GI ok to continue diet. NPO for procedures.  - MRCP results noted  - GI Dr. Acosta following   - ID Dr. Kayce Zhao following   - Surg Dr. Carey following  -Rest of the plan as above
67 yo male PMH CLL (not undergoing treatment), HTN, BPH presents to ED after outpatient CT scan showed multiple hepatic abscess, also with fatigue, rigors, fevers, myalgias, poor PO intake for past 1 month, admitted with sepsis 2/2 hepatic abscesses and thrombophlebitis of right hepatic vein.    - IR drainage today 1/31 for liver abscess: Plan d/w Surgery Dr. Carey and GI, no plan for IR drainage of the abscess, since high risk of complications due to location. Will continue antibiotics and transition from heparin drip to Eliquis at this time since no plan for any surgical intervention or IR procedure. (Hematology recommended Eliquis 5mg BID x 3 months)  - Ambulate as tolerated  -Denies diarrhea or abdominal pain.  -Adrenal Nodule noted. Seen by Endocrine . Will f/u as out patient.  -F/u final C&S  -Rest of the plan as above

## 2022-02-01 NOTE — PROGRESS NOTE ADULT - PROBLEM SELECTOR PLAN 9
Takes Losartan-HCTZ 100-25mg daily at home  - Hold home meds given slightly hypotensive on admission  - Monitor routine hemodynamics  - Can resume if patient becomes hypertensive

## 2022-02-01 NOTE — PROGRESS NOTE ADULT - SUBJECTIVE AND OBJECTIVE BOX
Patient is a 66y old  Male who presents with a chief complaint of hepatic abscess (01 Feb 2022 09:28)      INTERVAL HPI/OVERNIGHT EVENTS: no acute events overnight. pt seen and examined at bedside this morning. no acute complaints. reports he feels well. tolerating po. having bm. no n/v/d. no abd pain.     MEDICATIONS  (STANDING):  apixaban 5 milliGRAM(s) Oral every 12 hours  BACItracin   Ointment 1 Application(s) Topical daily  lactobacillus acidophilus 1 Tablet(s) Oral two times a day with meals  levoFLOXacin  Tablet 750 milliGRAM(s) Oral every 24 hours  metroNIDAZOLE    Tablet 500 milliGRAM(s) Oral every 8 hours  pantoprazole    Tablet 40 milliGRAM(s) Oral before breakfast  tamsulosin 0.4 milliGRAM(s) Oral at bedtime    MEDICATIONS  (PRN):  ondansetron Injectable 4 milliGRAM(s) IV Push every 8 hours PRN Nausea and/or Vomiting      Allergies    No Known Allergies    Intolerances        REVIEW OF SYSTEMS:  CONSTITUTIONAL: No fever or chills  HEENT:  No headache, no sore throat  RESPIRATORY: No cough, wheezing, or shortness of breath  CARDIOVASCULAR: No chest pain, palpitations  GASTROINTESTINAL: No abd pain, nausea, vomiting, or diarrhea  GENITOURINARY: No dysuria, frequency, or hematuria  NEUROLOGICAL: no focal weakness or dizziness  MUSCULOSKELETAL: no myalgias     Vital Signs Last 24 Hrs  T(C): 36.6 (01 Feb 2022 04:40), Max: 36.6 (31 Jan 2022 19:58)  T(F): 97.8 (01 Feb 2022 04:40), Max: 97.8 (31 Jan 2022 19:58)  HR: 92 (01 Feb 2022 04:40) (92 - 114)  BP: 122/75 (01 Feb 2022 04:40) (122/75 - 134/76)  BP(mean): --  RR: 18 (01 Feb 2022 04:40) (18 - 18)  SpO2: 93% (01 Feb 2022 04:40) (93% - 97%)    PHYSICAL EXAM:  GENERAL: NAD  HEENT:  anicteric, moist mucous membranes  CHEST/LUNG:  CTA b/l, no rales, wheezes, or rhonchi  HEART:  RRR, S1, S2  ABDOMEN:  BS+, soft, nontender, nondistended  EXTREMITIES: no edema, cyanosis, or calf tenderness  NERVOUS SYSTEM: answers questions and follows commands appropriately    LABS:                        8.6    26.56 )-----------( 494      ( 01 Feb 2022 09:37 )             27.6     CBC Full  -  ( 01 Feb 2022 09:37 )  WBC Count : 26.56 K/uL  Hemoglobin : 8.6 g/dL  Hematocrit : 27.6 %  Platelet Count - Automated : 494 K/uL  Mean Cell Volume : 93.2 fl  Mean Cell Hemoglobin : 29.1 pg  Mean Cell Hemoglobin Concentration : 31.2 gm/dL  Auto Neutrophil # : x  Auto Lymphocyte # : x  Auto Monocyte # : x  Auto Eosinophil # : x  Auto Basophil # : x  Auto Neutrophil % : x  Auto Lymphocyte % : x  Auto Monocyte % : x  Auto Eosinophil % : x  Auto Basophil % : x    01 Feb 2022 09:37    138    |  108    |  11     ----------------------------<  95     3.9     |  22     |  1.10     Ca    8.4        01 Feb 2022 09:37    TPro  6.6    /  Alb  1.7    /  TBili  0.3    /  DBili  x      /  AST  23     /  ALT  21     /  AlkPhos  168    01 Feb 2022 09:37    PT/INR - ( 31 Jan 2022 10:16 )   PT: 15.0 sec;   INR: 1.30 ratio         PTT - ( 31 Jan 2022 10:16 )  PTT:28.9 sec    CAPILLARY BLOOD GLUCOSE            Culture - Urine (collected 01-28-22 @ 19:03)  Source: Clean Catch Clean Catch (Midstream)  Final Report (01-29-22 @ 14:35):    <10,000 CFU/mL Normal Urogenital Kera    Culture - Blood (collected 01-28-22 @ 18:52)  Source: .Blood Blood-Peripheral  Gram Stain (01-30-22 @ 17:37):    Growth in anaerobic bottle: Gram Positive Cocci in Pairs and Chains    Growth in aerobic bottle: Gram positive cocci in pairs  Final Report (02-01-22 @ 08:24):    Growth in aerobic and anaerobic bottles: Streptococcus intermedius    ***Blood Panel PCR results on this specimen are available    approximately 3 hours after the Gram stain result.***    Gram stain, PCR, and/or culture results may not always    corresponddue to difference in methodologies.    ************************************************************    This PCR assay was performed by multiplex PCR. This    Assay tests for 66 bacterial and resistance gene targets.    Please refer to the [facility] Labs test directory    at [email or web address] for details.  Organism: Blood Culture PCR  Streptococcus intermedius (02-01-22 @ 08:24)  Organism: Streptococcus intermedius (02-01-22 @ 08:24)      -  Ceftriaxone: S 0.19      -  Penicillin: S 0.016      Method Type: ETEST  Organism: Streptococcus intermedius (02-01-22 @ 08:24)      -  Clindamycin: S      -  Erythromycin: S      -  Levofloxacin: S      -  Vancomycin: S      Method Type: KB  Organism: Blood Culture PCR (02-01-22 @ 08:24)      -  Streptococcus anginosus group: Detec      Method Type: PCR    Culture - Blood (collected 01-28-22 @ 18:52)  Source: .Blood Blood-Peripheral  Gram Stain (01-30-22 @ 17:42):    Growth in aerobic bottle: Gram Positive Cocci in Pairs and Chains    Growth in anaerobic bottle: Gram Positive Cocci in Pairs and Chains  Final Report (02-01-22 @ 08:24):    Growth in aerobic and anaerobic bottles: Streptococcus intermedius See    previous culture [id number]        RADIOLOGY & ADDITIONAL TESTS:    Personally reviewed.     Consultant(s) Notes Reviewed:  [x] YES  [ ] NO

## 2022-02-01 NOTE — PROGRESS NOTE ADULT - PROBLEM SELECTOR PLAN 4
Na 132, K 3.1 on admission  - Hyponatremia likely due to low solute intake over 1 month  - S/p 2L bolus in ED, start 60cc NS   - Potassium tab x2  - Monitor daily BMP  - now resolved

## 2022-02-01 NOTE — PROGRESS NOTE ADULT - SUBJECTIVE AND OBJECTIVE BOX
Date/Time Patient Seen:  		  Referring MD:   Data Reviewed	       Patient is a 66y old  Male who presents with a chief complaint of hepatic abscess (31 Jan 2022 16:07)      Subjective/HPI     PAST MEDICAL & SURGICAL HISTORY:  CLL (chronic lymphocytic leukemia)    Diverticulitis    HTN (hypertension)    Enlarged prostate    GERD (gastroesophageal reflux disease)    S/P inguinal hernia repair  bilat    S/P reconstruction of ligament of knee          Medication list         MEDICATIONS  (STANDING):  apixaban 5 milliGRAM(s) Oral every 12 hours  BACItracin   Ointment 1 Application(s) Topical daily  lactobacillus acidophilus 1 Tablet(s) Oral two times a day with meals  pantoprazole    Tablet 40 milliGRAM(s) Oral before breakfast  piperacillin/tazobactam IVPB.. 3.375 Gram(s) IV Intermittent every 8 hours  tamsulosin 0.4 milliGRAM(s) Oral at bedtime    MEDICATIONS  (PRN):  ondansetron Injectable 4 milliGRAM(s) IV Push every 8 hours PRN Nausea and/or Vomiting         Vitals log        ICU Vital Signs Last 24 Hrs  T(C): 36.6 (31 Jan 2022 19:58), Max: 36.6 (31 Jan 2022 19:58)  T(F): 97.8 (31 Jan 2022 19:58), Max: 97.8 (31 Jan 2022 19:58)  HR: 104 (31 Jan 2022 19:58) (93 - 114)  BP: 134/76 (31 Jan 2022 19:58) (123/74 - 134/76)  BP(mean): --  ABP: --  ABP(mean): --  RR: 18 (31 Jan 2022 19:58) (18 - 18)  SpO2: 94% (31 Jan 2022 19:58) (94% - 97%)           Input and Output:  I&O's Detail    30 Jan 2022 07:01  -  31 Jan 2022 07:00  --------------------------------------------------------  IN:    Heparin Infusion: 216 mL  Total IN: 216 mL    OUT:  Total OUT: 0 mL    Total NET: 216 mL          Lab Data                        9.3    29.22 )-----------( 512      ( 31 Jan 2022 09:08 )             28.3     01-31    137  |  107  |  14  ----------------------------<  102<H>  3.5   |  24  |  1.10    Ca    8.4<L>      31 Jan 2022 09:08    TPro  6.3  /  Alb  1.6<L>  /  TBili  0.4  /  DBili  x   /  AST  17  /  ALT  22  /  AlkPhos  144<H>  01-30            Review of Systems	      Objective     Physical Examination    heart s1s2  lung dc BS  abd soft  head nc  on RA    Pertinent Lab findings & Imaging      Francoise:  NO   Adequate UO     I&O's Detail    30 Jan 2022 07:01  -  31 Jan 2022 07:00  --------------------------------------------------------  IN:    Heparin Infusion: 216 mL  Total IN: 216 mL    OUT:  Total OUT: 0 mL    Total NET: 216 mL               Discussed with:     Cultures:	        Radiology

## 2022-02-01 NOTE — PROGRESS NOTE ADULT - PROBLEM SELECTOR PLAN 7
MRCP noted for 1cm adrenal nodule on left   - Endocrine Dr. Perlman consulted: recs to repeat adrenal imaging with CT washout protocol functional workup in outpt setting.  - 13mm Left adrenal nodule with complex cyst vs hematoma. will f/u outpatient with endo Dr. Perlman

## 2022-02-01 NOTE — PROGRESS NOTE ADULT - PROBLEM SELECTOR PLAN 12
- Heparin drip -->  Eliquis; since no plan for any surgical intervention or IR procedure. Hematology recommended Eliquis 5mg BID x 3 months.

## 2022-02-01 NOTE — PROGRESS NOTE ADULT - PROBLEM SELECTOR PLAN 3
Outpatient CT scan showing moderate right pleural effusion with associated segmental and subsegmental consolidations in the posterior right middle lobe and right lower lobe may represent atelectasis and/or pneumonia  - Likely reactive in setting of hepatic abscess, however may need thoracentesis with IR and fluid analysis  - On IV Zosyn which will cover for possible PNA   - F/u repeat blood cultures  - ID Dr. Brand following   - Pulm Dr. Hartley following

## 2022-02-01 NOTE — PROGRESS NOTE ADULT - PROBLEM SELECTOR PLAN 8
Diagnosed in 2017, not undergoing active treatment  - Follows outpatient with H/O Dr. Tubbs  - Baseline WBC 24  - elevated in the setting of bacteremia and acute processes; downtrending  - continue to monitor WBC

## 2022-02-01 NOTE — PROGRESS NOTE ADULT - TIME BILLING
Patient seen and examined at bedside. Meds, labs, chart, vitals reviewed. Plan d/w patient, RN
Patient seen and examined at bedside. Meds, labs, chart, vitals reviewed. Plan d/w patient, RN
Patient seen and examined at bedside. Meds, labs, vital, follow up consult notes reviewed. Plan d/w ID, Surgery, GI, RN, Residents, SW/CM
Patient seen and examined at bedside. Chart, meds, labs, vitals reviewed. D/w residents, patient, RN, SW/CM, consultants

## 2022-02-01 NOTE — PROGRESS NOTE ADULT - PROBLEM SELECTOR PLAN 2
Outpatient CT scan reports showing findings most suggestive of multiple abscesses in the right hepatic lobe with associated thrombosis of the right hepatic vein consistent with septic thrombophlebitis  - Septic workup as above  - Heparin drip -->  Eliquis; since no plan for any surgical intervention or IR procedure. Hematology recommended Eliquis 5mg BID x 3 months.

## 2022-02-02 ENCOUNTER — TRANSCRIPTION ENCOUNTER (OUTPATIENT)
Age: 67
End: 2022-02-02

## 2022-02-02 VITALS
TEMPERATURE: 98 F | RESPIRATION RATE: 17 BRPM | HEART RATE: 118 BPM | SYSTOLIC BLOOD PRESSURE: 125 MMHG | OXYGEN SATURATION: 95 % | DIASTOLIC BLOOD PRESSURE: 83 MMHG

## 2022-02-02 LAB
ALBUMIN SERPL ELPH-MCNC: 2.1 G/DL — LOW (ref 3.3–5)
ALP SERPL-CCNC: 192 U/L — HIGH (ref 40–120)
ALT FLD-CCNC: 25 U/L — SIGNIFICANT CHANGE UP (ref 12–78)
ANION GAP SERPL CALC-SCNC: 7 MMOL/L — SIGNIFICANT CHANGE UP (ref 5–17)
AST SERPL-CCNC: 25 U/L — SIGNIFICANT CHANGE UP (ref 15–37)
BASOPHILS # BLD AUTO: 0.12 K/UL — SIGNIFICANT CHANGE UP (ref 0–0.2)
BASOPHILS NFR BLD AUTO: 0.4 % — SIGNIFICANT CHANGE UP (ref 0–2)
BILIRUB SERPL-MCNC: 0.4 MG/DL — SIGNIFICANT CHANGE UP (ref 0.2–1.2)
BUN SERPL-MCNC: 12 MG/DL — SIGNIFICANT CHANGE UP (ref 7–23)
CALCIUM SERPL-MCNC: 9.1 MG/DL — SIGNIFICANT CHANGE UP (ref 8.5–10.1)
CHLORIDE SERPL-SCNC: 109 MMOL/L — HIGH (ref 96–108)
CO2 SERPL-SCNC: 21 MMOL/L — LOW (ref 22–31)
CREAT SERPL-MCNC: 1.2 MG/DL — SIGNIFICANT CHANGE UP (ref 0.5–1.3)
EOSINOPHIL # BLD AUTO: 0.09 K/UL — SIGNIFICANT CHANGE UP (ref 0–0.5)
EOSINOPHIL NFR BLD AUTO: 0.3 % — SIGNIFICANT CHANGE UP (ref 0–6)
GLUCOSE SERPL-MCNC: 106 MG/DL — HIGH (ref 70–99)
HCT VFR BLD CALC: 32.2 % — LOW (ref 39–50)
HGB BLD-MCNC: 10.6 G/DL — LOW (ref 13–17)
IMM GRANULOCYTES NFR BLD AUTO: 2.4 % — HIGH (ref 0–1.5)
LYMPHOCYTES # BLD AUTO: 17.91 K/UL — HIGH (ref 1–3.3)
LYMPHOCYTES # BLD AUTO: 61.2 % — HIGH (ref 13–44)
MCHC RBC-ENTMCNC: 30 PG — SIGNIFICANT CHANGE UP (ref 27–34)
MCHC RBC-ENTMCNC: 32.9 GM/DL — SIGNIFICANT CHANGE UP (ref 32–36)
MCV RBC AUTO: 91.2 FL — SIGNIFICANT CHANGE UP (ref 80–100)
MONOCYTES # BLD AUTO: 0.48 K/UL — SIGNIFICANT CHANGE UP (ref 0–0.9)
MONOCYTES NFR BLD AUTO: 1.6 % — LOW (ref 2–14)
NEUTROPHILS # BLD AUTO: 9.98 K/UL — HIGH (ref 1.8–7.4)
NEUTROPHILS NFR BLD AUTO: 34.1 % — LOW (ref 43–77)
NRBC # BLD: 0 /100 WBCS — SIGNIFICANT CHANGE UP (ref 0–0)
PLATELET # BLD AUTO: 587 K/UL — HIGH (ref 150–400)
POTASSIUM SERPL-MCNC: 4.1 MMOL/L — SIGNIFICANT CHANGE UP (ref 3.5–5.3)
POTASSIUM SERPL-SCNC: 4.1 MMOL/L — SIGNIFICANT CHANGE UP (ref 3.5–5.3)
PROT SERPL-MCNC: 7.9 G/DL — SIGNIFICANT CHANGE UP (ref 6–8.3)
RBC # BLD: 3.53 M/UL — LOW (ref 4.2–5.8)
RBC # FLD: 14.4 % — SIGNIFICANT CHANGE UP (ref 10.3–14.5)
SODIUM SERPL-SCNC: 137 MMOL/L — SIGNIFICANT CHANGE UP (ref 135–145)
WBC # BLD: 29.28 K/UL — HIGH (ref 3.8–10.5)
WBC # FLD AUTO: 29.28 K/UL — HIGH (ref 3.8–10.5)

## 2022-02-02 PROCEDURE — 84466 ASSAY OF TRANSFERRIN: CPT

## 2022-02-02 PROCEDURE — 74178 CT ABD&PLV WO CNTR FLWD CNTR: CPT

## 2022-02-02 PROCEDURE — 87040 BLOOD CULTURE FOR BACTERIA: CPT

## 2022-02-02 PROCEDURE — 84100 ASSAY OF PHOSPHORUS: CPT

## 2022-02-02 PROCEDURE — 96374 THER/PROPH/DIAG INJ IV PUSH: CPT

## 2022-02-02 PROCEDURE — 80048 BASIC METABOLIC PNL TOTAL CA: CPT

## 2022-02-02 PROCEDURE — 87150 DNA/RNA AMPLIFIED PROBE: CPT

## 2022-02-02 PROCEDURE — 87086 URINE CULTURE/COLONY COUNT: CPT

## 2022-02-02 PROCEDURE — 86850 RBC ANTIBODY SCREEN: CPT

## 2022-02-02 PROCEDURE — 83735 ASSAY OF MAGNESIUM: CPT

## 2022-02-02 PROCEDURE — 0225U NFCT DS DNA&RNA 21 SARSCOV2: CPT

## 2022-02-02 PROCEDURE — 36415 COLL VENOUS BLD VENIPUNCTURE: CPT

## 2022-02-02 PROCEDURE — 83605 ASSAY OF LACTIC ACID: CPT

## 2022-02-02 PROCEDURE — 80053 COMPREHEN METABOLIC PANEL: CPT

## 2022-02-02 PROCEDURE — 83540 ASSAY OF IRON: CPT

## 2022-02-02 PROCEDURE — A9579: CPT

## 2022-02-02 PROCEDURE — 87077 CULTURE AEROBIC IDENTIFY: CPT

## 2022-02-02 PROCEDURE — 74183 MRI ABD W/O CNTR FLWD CNTR: CPT

## 2022-02-02 PROCEDURE — 85025 COMPLETE CBC W/AUTO DIFF WBC: CPT

## 2022-02-02 PROCEDURE — 82728 ASSAY OF FERRITIN: CPT

## 2022-02-02 PROCEDURE — 85610 PROTHROMBIN TIME: CPT

## 2022-02-02 PROCEDURE — 85730 THROMBOPLASTIN TIME PARTIAL: CPT

## 2022-02-02 PROCEDURE — 86900 BLOOD TYPING SEROLOGIC ABO: CPT

## 2022-02-02 PROCEDURE — 81001 URINALYSIS AUTO W/SCOPE: CPT

## 2022-02-02 PROCEDURE — 85027 COMPLETE CBC AUTOMATED: CPT

## 2022-02-02 PROCEDURE — 86901 BLOOD TYPING SEROLOGIC RH(D): CPT

## 2022-02-02 PROCEDURE — 83550 IRON BINDING TEST: CPT

## 2022-02-02 PROCEDURE — 71045 X-RAY EXAM CHEST 1 VIEW: CPT

## 2022-02-02 PROCEDURE — 99239 HOSP IP/OBS DSCHRG MGMT >30: CPT | Mod: GC

## 2022-02-02 PROCEDURE — 96375 TX/PRO/DX INJ NEW DRUG ADDON: CPT

## 2022-02-02 PROCEDURE — 86803 HEPATITIS C AB TEST: CPT

## 2022-02-02 PROCEDURE — 93005 ELECTROCARDIOGRAM TRACING: CPT

## 2022-02-02 PROCEDURE — 87184 SC STD DISK METHOD PER PLATE: CPT

## 2022-02-02 PROCEDURE — 87181 SC STD AGAR DILUTION PER AGT: CPT

## 2022-02-02 PROCEDURE — 99285 EMERGENCY DEPT VISIT HI MDM: CPT | Mod: 25

## 2022-02-02 RX ORDER — METRONIDAZOLE 500 MG
1 TABLET ORAL
Qty: 0 | Refills: 0 | DISCHARGE
Start: 2022-02-02

## 2022-02-02 RX ORDER — CIPROFLOXACIN LACTATE 400MG/40ML
1 VIAL (ML) INTRAVENOUS
Qty: 28 | Refills: 0
Start: 2022-02-02 | End: 2022-03-01

## 2022-02-02 RX ORDER — APIXABAN 2.5 MG/1
1 TABLET, FILM COATED ORAL
Qty: 180 | Refills: 0
Start: 2022-02-02 | End: 2022-05-02

## 2022-02-02 RX ORDER — CIPROFLOXACIN LACTATE 400MG/40ML
1 VIAL (ML) INTRAVENOUS
Qty: 0 | Refills: 0 | DISCHARGE
Start: 2022-02-02

## 2022-02-02 RX ORDER — METRONIDAZOLE 500 MG
1 TABLET ORAL
Qty: 84 | Refills: 0
Start: 2022-02-02 | End: 2022-03-01

## 2022-02-02 RX ORDER — APIXABAN 2.5 MG/1
1 TABLET, FILM COATED ORAL
Qty: 0 | Refills: 0 | DISCHARGE
Start: 2022-02-02 | End: 2022-05-02

## 2022-02-02 RX ORDER — APIXABAN 2.5 MG/1
1 TABLET, FILM COATED ORAL
Qty: 0 | Refills: 0 | DISCHARGE
Start: 2022-02-02

## 2022-02-02 RX ADMIN — Medication 1 TABLET(S): at 08:42

## 2022-02-02 RX ADMIN — Medication 500 MILLIGRAM(S): at 13:14

## 2022-02-02 RX ADMIN — Medication 500 MILLIGRAM(S): at 05:40

## 2022-02-02 RX ADMIN — Medication 1 APPLICATION(S): at 11:35

## 2022-02-02 RX ADMIN — PANTOPRAZOLE SODIUM 40 MILLIGRAM(S): 20 TABLET, DELAYED RELEASE ORAL at 05:39

## 2022-02-02 RX ADMIN — APIXABAN 5 MILLIGRAM(S): 2.5 TABLET, FILM COATED ORAL at 05:40

## 2022-02-02 NOTE — PROGRESS NOTE ADULT - SUBJECTIVE AND OBJECTIVE BOX
[facility]  Ray Coats PA-C  [facility] [place] [address]  [phone]      INTERVAL HPI/OVERNIGHT EVENTS:  Pt s/e  Pt reports he has no GI complaints  Planned for d/c     MEDICATIONS  (STANDING):  apixaban 5 milliGRAM(s) Oral every 12 hours  BACItracin   Ointment 1 Application(s) Topical daily  lactobacillus acidophilus 1 Tablet(s) Oral two times a day with meals  levoFLOXacin  Tablet 750 milliGRAM(s) Oral every 24 hours  metroNIDAZOLE    Tablet 500 milliGRAM(s) Oral every 8 hours  pantoprazole    Tablet 40 milliGRAM(s) Oral before breakfast  tamsulosin 0.4 milliGRAM(s) Oral at bedtime    MEDICATIONS  (PRN):  ondansetron Injectable 4 milliGRAM(s) IV Push every 8 hours PRN Nausea and/or Vomiting      Allergies    No Known Allergies    PHYSICAL EXAM:   Vital Signs:  Vital Signs Last 24 Hrs  T(C): 36.4 (2022 05:38), Max: 36.9 (2022 20:34)  T(F): 97.5 (2022 05:38), Max: 98.4 (2022 20:34)  HR: 100 (2022 05:38) (100 - 104)  BP: 144/83 (2022 05:38) (126/77 - 144/83)  BP(mean): --  RR: 18 (2022 05:38) (17 - 18)  SpO2: 94% (2022 05:38) (93% - 95%)  Daily     Daily Weight in k (2022 05:38)    GENERAL:  Appears stated age,   HEENT:  NC/AT,    CHEST:  Full & symmetric excursion,   HEART:  Regular rhythm,  ABDOMEN:  Soft, non-tender, non-distended,  EXTEREMITIES:  no cyanosis  SKIN:  No rash  NEURO:  Alert,       LABS:                        10.6   29.28 )-----------( 587      ( 2022 09:06 )             32.2         137  |  109<H>  |  12  ----------------------------<  106<H>  4.1   |  21<L>  |  1.20    Ca    9.1      2022 09:06    TPro  7.9  /  Alb  2.1<L>  /  TBili  0.4  /  DBili  x   /  AST  25  /  ALT  25  /  AlkPhos  192<H>

## 2022-02-02 NOTE — PHARMACOTHERAPY INTERVENTION NOTE - COMMENTS
Patient on regular diet.  On metronidazole for hepatic abscess.   Discussed IV to PO with Dr. Bermudez.  Approved by Dr. Bermudez.  Entered order per telephone order.
Patient is ordered for Eliquis 5mg bid for splenic vein thrombosis [PVT] Spoke to Dr. Neto MD is aware and approves of off-label indication. MD would not like to treat the PVT as an acute DVT and would like to proceed with 5mg BID dosing as patient is a high bleed risk.

## 2022-02-02 NOTE — PROGRESS NOTE ADULT - SUBJECTIVE AND OBJECTIVE BOX
Date/Time Patient Seen:  		  Referring MD:   Data Reviewed	       Patient is a 66y old  Male who presents with a chief complaint of hepatic abscess (01 Feb 2022 15:50)      Subjective/HPI     PAST MEDICAL & SURGICAL HISTORY:  CLL (chronic lymphocytic leukemia)    Diverticulitis    HTN (hypertension)    Enlarged prostate    GERD (gastroesophageal reflux disease)    S/P inguinal hernia repair  bilat    S/P reconstruction of ligament of knee          Medication list         MEDICATIONS  (STANDING):  apixaban 5 milliGRAM(s) Oral every 12 hours  BACItracin   Ointment 1 Application(s) Topical daily  lactobacillus acidophilus 1 Tablet(s) Oral two times a day with meals  levoFLOXacin  Tablet 750 milliGRAM(s) Oral every 24 hours  metroNIDAZOLE    Tablet 500 milliGRAM(s) Oral every 8 hours  pantoprazole    Tablet 40 milliGRAM(s) Oral before breakfast  tamsulosin 0.4 milliGRAM(s) Oral at bedtime    MEDICATIONS  (PRN):  ondansetron Injectable 4 milliGRAM(s) IV Push every 8 hours PRN Nausea and/or Vomiting         Vitals log        ICU Vital Signs Last 24 Hrs  T(C): 36.9 (01 Feb 2022 20:34), Max: 36.9 (01 Feb 2022 20:34)  T(F): 98.4 (01 Feb 2022 20:34), Max: 98.4 (01 Feb 2022 20:34)  HR: 104 (01 Feb 2022 20:34) (102 - 104)  BP: 126/77 (01 Feb 2022 20:34) (126/77 - 141/82)  BP(mean): --  ABP: --  ABP(mean): --  RR: 17 (01 Feb 2022 20:34) (17 - 17)  SpO2: 93% (01 Feb 2022 20:34) (93% - 95%)           Input and Output:  I&O's Detail    01 Feb 2022 07:01  -  02 Feb 2022 05:14  --------------------------------------------------------  IN:  Total IN: 0 mL    OUT:    Voided (mL): 800 mL  Total OUT: 800 mL    Total NET: -800 mL          Lab Data                        8.6    26.56 )-----------( 494      ( 01 Feb 2022 09:37 )             27.6     02-01    138  |  108  |  11  ----------------------------<  95  3.9   |  22  |  1.10    Ca    8.4<L>      01 Feb 2022 09:37    TPro  6.6  /  Alb  1.7<L>  /  TBili  0.3  /  DBili  x   /  AST  23  /  ALT  21  /  AlkPhos  168<H>  02-01            Review of Systems	      Objective     Physical Examination    heart s1s2  lung dec BS  abd soft  head nc      Pertinent Lab findings & Imaging      Francoise:  NO   Adequate UO     I&O's Detail    01 Feb 2022 07:01  -  02 Feb 2022 05:14  --------------------------------------------------------  IN:  Total IN: 0 mL    OUT:    Voided (mL): 800 mL  Total OUT: 800 mL    Total NET: -800 mL               Discussed with:     Cultures:	        Radiology

## 2022-02-02 NOTE — PROGRESS NOTE ADULT - SUBJECTIVE AND OBJECTIVE BOX
[facility], Division of Infectious Diseases  MELE Damon Y. Patel, S. Shah, G. Casimir  [phone]  ([phone] - weekdays after 5pm and weekends)    Name: SAMI MILES  Age/Gender: 66y Male  MRN: [id number]    Interval History:  Patient seen this morning, feel well.   No new complaints.   Notes reviewed  No concerning overnight events  Afebrile     Allergies: No Known Allergies    Objective:  Vitals:   T(F): 97.5 (02-02-22 @ 05:38), Max: 98.4 (02-01-22 @ 20:34)  HR: 100 (02-02-22 @ 05:38) (100 - 104)  BP: 144/83 (02-02-22 @ 05:38) (126/77 - 144/83)  RR: 18 (02-02-22 @ 05:38) (17 - 18)  SpO2: 94% (02-02-22 @ 05:38) (93% - 95%)  Physical Examination:  General: no acute distress  HEENT: NC/AT, anicteric, neck supple  Respiratory: no acc muscle use, breathing comfortably  Cardiovascular: S1 and S2 present  Gastrointestinal: normal appearing, nondistended  Extremities: no edema, no cyanosis  Skin: no visible rash    Laboratory Studies:  CBC:                       10.6   29.28 )-----------( 587      ( 02 Feb 2022 09:06 )             32.2     WBC Trend:  29.28 02-02-22 @ 09:06  26.56 02-01-22 @ 09:37  29.22 01-31-22 @ 09:08  30.37 01-30-22 @ 08:42  37.21 01-29-22 @ 15:13  34.86 01-29-22 @ 08:08  37.35 01-29-22 @ 00:41  44.77 01-28-22 @ 14:12    CMP: 02-02    137  |  109<H>  |  12  ----------------------------<  106<H>  4.1   |  21<L>  |  1.20    Ca    9.1      02 Feb 2022 09:06    TPro  7.9  /  Alb  2.1<L>  /  TBili  0.4  /  DBili  x   /  AST  25  /  ALT  25  /  AlkPhos  192<H>  02-02    LIVER FUNCTIONS - ( 02 Feb 2022 09:06 )  Alb: 2.1 g/dL / Pro: 7.9 g/dL / ALK PHOS: 192 U/L / ALT: 25 U/L / AST: 25 U/L / GGT: x           Microbiology: reviewed   Culture - Blood (collected 01-31-22 @ 14:59)  Source: .Blood Blood-Peripheral  Preliminary Report (02-01-22 @ 15:01):    No growth to date.    Culture - Blood (collected 01-31-22 @ 12:13)  Source: .Blood Blood-Peripheral  Preliminary Report (02-01-22 @ 13:01):    No growth to date.    Culture - Urine (collected 01-28-22 @ 19:03)  Source: Clean Catch Clean Catch (Midstream)  Final Report (01-29-22 @ 14:35):    <10,000 CFU/mL Normal Urogenital Kera    Culture - Blood (collected 01-28-22 @ 18:52)  Source: .Blood Blood-Peripheral  Gram Stain (01-30-22 @ 17:37):    Growth in anaerobic bottle: Gram Positive Cocci in Pairs and Chains    Growth in aerobic bottle: Gram positive cocci in pairs  Final Report (02-01-22 @ 08:24):    Growth in aerobic and anaerobic bottles: Streptococcus intermedius    ***Blood Panel PCR results on this specimen are available    approximately 3 hours after the Gram stain result.***    Gram stain, PCR, and/or culture results may not always    corresponddue to difference in methodologies.    ************************************************************    This PCR assay was performed by multiplex PCR. This    Assay tests for 66 bacterial and resistance gene targets.    Please refer to the [facility] test directory    at [email or web address] for details.  Organism: Blood Culture PCR  Streptococcus intermedius (02-01-22 @ 08:24)  Organism: Streptococcus intermedius (02-01-22 @ 08:24)      -  Ceftriaxone: S 0.19      -  Penicillin: S 0.016      Method Type: ETEST  Organism: Streptococcus intermedius (02-01-22 @ 08:24)      -  Clindamycin: S      -  Erythromycin: S      -  Levofloxacin: S      -  Vancomycin: S      Method Type: KB  Organism: Blood Culture PCR (02-01-22 @ 08:24)      -  Streptococcus anginosus group: Detec      Method Type: PCR    Culture - Blood (collected 01-28-22 @ 18:52)  Source: .Blood Blood-Peripheral  Gram Stain (01-30-22 @ 17:42):    Growth in aerobic bottle: Gram Positive Cocci in Pairs and Chains    Growth in anaerobic bottle: Gram Positive Cocci in Pairs and Chains  Final Report (02-01-22 @ 08:24):    Growth in aerobic and anaerobic bottles: Streptococcus intermedius See    previous culture [id number]    Radiology: reviewed     Medications:  apixaban 5 milliGRAM(s) Oral every 12 hours  BACItracin   Ointment 1 Application(s) Topical daily  lactobacillus acidophilus 1 Tablet(s) Oral two times a day with meals  levoFLOXacin  Tablet 750 milliGRAM(s) Oral every 24 hours  metroNIDAZOLE    Tablet 500 milliGRAM(s) Oral every 8 hours  ondansetron Injectable 4 milliGRAM(s) IV Push every 8 hours PRN  pantoprazole    Tablet 40 milliGRAM(s) Oral before breakfast  tamsulosin 0.4 milliGRAM(s) Oral at bedtime    Antimicrobials:  levoFLOXacin  Tablet 750 milliGRAM(s) Oral every 24 hours  metroNIDAZOLE    Tablet 500 milliGRAM(s) Oral every 8 hours

## 2022-02-02 NOTE — PROGRESS NOTE ADULT - SUBJECTIVE AND OBJECTIVE BOX
SUBJECTIVE:  67 yo male PMH CLL (not undergoing treatment), HTN, BPH admitted for multiple liver abscesses found on outpatient CT scan. Patient seen and examined at bedside.  No overnight events. Patient afebrile, remains mildly tachycardic. Patient with no new complaints at this time, tolerating diet, admits to flatus and bowel movement.  Patient denies any fevers, chills, chest pain, shortness of breath, nausea, vomiting or diarrhea. Patient does admit to sweating but attributes that to the temperature of his room.     Vital Signs Last 24 Hrs  T(C): 36.4 (02 Feb 2022 05:38), Max: 36.9 (01 Feb 2022 20:34)  T(F): 97.5 (02 Feb 2022 05:38), Max: 98.4 (01 Feb 2022 20:34)  HR: 100 (02 Feb 2022 05:38) (100 - 104)  BP: 144/83 (02 Feb 2022 05:38) (126/77 - 144/83)  BP(mean): --  RR: 18 (02 Feb 2022 05:38) (17 - 18)  SpO2: 94% (02 Feb 2022 05:38) (93% - 95%)    PHYSICAL EXAM:  GENERAL: NAD  HEENT:  anicteric, moist mucous membranes  CHEST/LUNG:  CTA b/l, no rales, wheezes, or rhonchi  HEART:  Regular rhythm, tachycardic, S1, S2  ABDOMEN:  BS+, soft, nontender, nondistended  EXTREMITIES: no edema, cyanosis, or calf tenderness  NERVOUS SYSTEM: answers questions and follows commands appropriately    I&O's Summary    01 Feb 2022 07:01  -  02 Feb 2022 07:00  --------------------------------------------------------  IN: 0 mL / OUT: 800 mL / NET: -800 mL      I&O's Detail    01 Feb 2022 07:01  -  02 Feb 2022 07:00  --------------------------------------------------------  IN:  Total IN: 0 mL    OUT:    Voided (mL): 800 mL  Total OUT: 800 mL    Total NET: -800 mL        MEDICATIONS  (STANDING):  apixaban 5 milliGRAM(s) Oral every 12 hours  BACItracin   Ointment 1 Application(s) Topical daily  lactobacillus acidophilus 1 Tablet(s) Oral two times a day with meals  levoFLOXacin  Tablet 750 milliGRAM(s) Oral every 24 hours  metroNIDAZOLE    Tablet 500 milliGRAM(s) Oral every 8 hours  pantoprazole    Tablet 40 milliGRAM(s) Oral before breakfast  tamsulosin 0.4 milliGRAM(s) Oral at bedtime    MEDICATIONS  (PRN):  ondansetron Injectable 4 milliGRAM(s) IV Push every 8 hours PRN Nausea and/or Vomiting    LABS:                        8.6    26.56 )-----------( 494      ( 01 Feb 2022 09:37 )             27.6     02-01    138  |  108  |  11  ----------------------------<  95  3.9   |  22  |  1.10    Ca    8.4<L>      01 Feb 2022 09:37    TPro  6.6  /  Alb  1.7<L>  /  TBili  0.3  /  DBili  x   /  AST  23  /  ALT  21  /  AlkPhos  168<H>  02-01    PT/INR - ( 31 Jan 2022 10:16 )   PT: 15.0 sec;   INR: 1.30 ratio         PTT - ( 31 Jan 2022 10:16 )  PTT:28.9 sec    RADIOLOGY & ADDITIONAL STUDIES:  No new studies in the past 24 hours

## 2022-02-02 NOTE — PROGRESS NOTE ADULT - ASSESSMENT
[ASSESSMENT and  PLAN]  65 yo male well known to Dr. Tubbs in our group with known CLL with ZAP70 negative, CD 38 negative, IgVH mutated, FISH -del(13q)  baseline WBC 23 to 25, Hgb 14, Plt 370    Admitted with  acute diverticulitis, multiple hepatic abscess and R hepatic vein thrombosis likely due to infection. Possible septic thrombophlebitis    RECOMMENDATIONS  - started IV antibiotics with clinical improvement now transitioned to PO. No further fever  - was planned for IR drainage of liver abscess but due to location and bleeding risk given need for anticoagulation, IR has cancelled the case  - thoracentesis offered but declined by pt and had also startd Eliquis.   Pt generally complinat with followup. Recommended to have outpt followup of effusion, and if not improved to reconsider tap as risk for infection, empyema and loculation.   - plan for repeat imaging with CT for monitoring of live abscesses.   - to continue  Eliquis 5mg BID with plan for 3 months of anticoagulation given provoked thrombotic events in setting of liver inflammation/infection  Can hold for 48hr for procedures, eg Thoracentesis if needed.   - patient to follow up with Dr. Tubbs in office upon discharge, has prior appt scheduled 02/14.

## 2022-02-02 NOTE — PROGRESS NOTE ADULT - SUBJECTIVE AND OBJECTIVE BOX
[INTERVAL HX: ]  Patient seen and examined;  Chart reviewed and events noted;   no bleeding.   No CP, no SOB.   no fever       Patient is a 66y Male with a known history of :  Abscess of liver [K75.0]  CLL (chronic lymphocytic leukemia) [C91.10]  Diverticulitis [K57.92]  HTN (hypertension) [I10]  Enlarged prostate [N40.0]  GERD (gastroesophageal reflux disease) [K21.9]  S/P inguinal hernia repair [Z98.890]  S/P reconstruction of ligament of knee [Z98.890]    HPI:  65 yo male PMH CLL (not undergoing treatment), HTN, BPH presents to ED after outpatient CT scan showed multiple hepatic abscess, sent by H/O Dr. Tubbs. Patient states for past 1-2 months he has had intermittent weakness, body aches, chills, fatigue, exertional dyspnea. Patient states over Yasmeen he was "bed ridden" for days due to exhaustion and rigors. Patient thought he was improving, however this week he had 2 episodes of severe rigors lasting 1.5 hours, followed by periods of severe diaphoresis. Patient regularly follows with Dr. Tubbs and last saw him on  for his symptoms,  states he was told his WBC was "double" his baseline (baseline WBC 24). Patient was sent for outpatient imaging and got a CT Scan of abd/p earlier this AM. States he then got a call from Dr. Tubbs telling him to come to ED immediately for further evaluation. Patient denies any recent travel, sick contacts, abd pain, N/V/D, CP. Patient still c/o dry cough, exertional dyspnea, exhaustion, rigors, fevers, poor PO intake, myalgias. Denies eating any unusual or raw/undercooked meat.     In ED:  Vitals: T 100.7, , /62, RR 18, SpO2 97% on RA  Labs significant for: WBC 44.77, H/H 9.5/28.4, plt 462, neutrophil 18.8, lymphocyte 24.6, Na 132, K 3.1, BUN 25, Cr 1.30, albumin 1.9, alk phos 207, GFR 58  UA: spec gravity 1.005, protein 30, trace blood, 6-10 WBC, 3-5 RBC  CT chest/abd/p: Findings most suggestive of multiple abscesses in the right hepatic lobe with associated thrombosis of the right hepatic vein consistent with septic thrombophlebitis.   -Findings likely representing acute diverticulitis of the distal descending colon.  -Moderate right pleural effusion with associated segmental and subsegmental consolidations in the posterior right middle lobe and right lower lobe may represent atelectasis and/or pneumonia.  -Indeterminate 1 cm left adrenal nodule.  EKG: sinus tachy rate 121, possible LAE  Given: 650mg PO Tylenol x1, 500mg PO Flagyl x1, IV Zosyn x1, 2L NS bolus x1 (2022 17:21)      MEDICATIONS  (STANDING):  apixaban 5 milliGRAM(s) Oral every 12 hours  BACItracin   Ointment 1 Application(s) Topical daily  lactobacillus acidophilus 1 Tablet(s) Oral two times a day with meals  levoFLOXacin  Tablet 750 milliGRAM(s) Oral every 24 hours  metroNIDAZOLE    Tablet 500 milliGRAM(s) Oral every 8 hours  pantoprazole    Tablet 40 milliGRAM(s) Oral before breakfast  tamsulosin 0.4 milliGRAM(s) Oral at bedtime    MEDICATIONS  (PRN):  ondansetron Injectable 4 milliGRAM(s) IV Push every 8 hours PRN Nausea and/or Vomiting    Vital Signs Last 24 Hrs  T(C): 36.4 (2022 05:38), Max: 36.9 (2022 20:34)  T(F): 97.5 (2022 05:38), Max: 98.4 (2022 20:34)  HR: 100 (2022 05:38) (100 - 104)  BP: 144/83 (2022 05:38) (126/77 - 144/83)  BP(mean): --  RR: 18 (2022 05:38) (17 - 18)  SpO2: 94% (2022 05:38) (93% - 95%)  Daily     Daily Weight in k (2022 05:38)  Last Menstrual Period          [PHYSICAL EXAM]  General: adult in NAD,  WN,  WD.  HEENT: clear oropharynx, anicteric sclera, pink conjunctivae.  Neck: supple, no masses.  CV: normal S1S2, no murmur, no rubs, no gallops.  Lungs: clear to auscultation except dec BS 1/3 up on R,  no wheezes, no rales, no rhonchi.  Abdomen: soft, non-tender, non-distended, no hepatosplenomegaly, normal BS, no guarding.  Ext: no clubbing, no cyanosis, no edema.  Skin: no rashes,  no petechiae, no venous stasis changes.  Neuro: alert and oriented X3  , no focal motor deficits.  LN: no SC FANTASMA.      [LABS:]                        10.6   29.28 )-----------( 587      ( 2022 09:06 )             32.2     02    137  |  109<H>  |  12  ----------------------------<  106<H>  4.1   |  21<L>  |  1.20    Ca    9.1      2022 09:06    TPro  7.9  /  Alb  2.1<L>  /  TBili  0.4  /  DBili  x   /  AST  25  /  ALT  25  /  AlkPhos  192<H>          Ferritin, Serum: 911 ng/mL (22 @ 10:45)  Iron - Total Binding Capacity.: 170 ug/dL (22 @ 10:45)    Culture - Blood (collected 2022 14:59)  Source: .Blood Blood-Peripheral  Preliminary Report (2022 15:01):    No growth to date.    Culture - Blood (collected 2022 12:13)  Source: .Blood Blood-Peripheral  Preliminary Report (2022 13:01):    No growth to date.      SARS-CoV-2: NotDetec (2022 14:12)        [RADIOLOGY STUDIES:]

## 2022-02-02 NOTE — PROGRESS NOTE ADULT - PROBLEM SELECTOR PROBLEM 2
Splenic vein thrombosis
Diverticulitis
Splenic vein thrombosis

## 2022-02-02 NOTE — PROGRESS NOTE ADULT - PROBLEM SELECTOR PLAN 2
- Transitioned to Eliquis from heparin drip as per medicine   - no need for acute surgical intervention

## 2022-02-02 NOTE — PROGRESS NOTE ADULT - PROBLEM SELECTOR PLAN 1
- No IR drainage due to location of abscess. Will likely need colectomy as outpatient   - Cont PO Levaquin & Flagyl as per ID  - pain control, supportive care, OOB, incentive spirometer  - Reg diet  - Case to be discussed with Dr. Carey - No IR drainage due to location of abscess. Will likely need colectomy as outpatient   - c/w PO Levaquin & Flagyl as per ID  - c/w pain control, supportive care, OOB, incentive spirometer  - c/w Reg diet  - Case to be discussed with Dr. Carey - No IR drainage due to location of abscess. Will likely need colectomy as outpatient   - c/w PO Levaquin & Flagyl as per ID  - c/w pain control, supportive care, OOB, incentive spirometer  - c/w Reg diet  - Case discussed with Dr. Carey

## 2022-02-02 NOTE — DISCHARGE NOTE NURSING/CASE MANAGEMENT/SOCIAL WORK - PATIENT PORTAL LINK FT
You can access the FollowMyHealth Patient Portal offered by [facility] by registering at the following website: [email or web address]. By joining [facility]’s FollowMyHealth portal, you will also be able to view your health information using other applications (apps) compatible with our system.

## 2022-02-02 NOTE — DISCHARGE NOTE NURSING/CASE MANAGEMENT/SOCIAL WORK - NSDCPEFALRISK_GEN_ALL_CORE
For information on Fall & Injury Prevention, visit: [email or web address] OR  [email or web address] OR  https://www.cdc.gov/steadi/patient.html

## 2022-02-02 NOTE — PROGRESS NOTE ADULT - ASSESSMENT
65 yo male with pmhx of CLL diverticulitis GERD and HTN sent in by Dr. Tubbs for liver abscess seen on out pt CT.    intraabdominal abscess  thrombosis of the right hepatic vein consistent with septic thrombophlebitis  CLL  hx of Diverticular Disease of Intestine  Pleural eff - Atelectasis  Adrenal nodule    MRI reviewed  on PO ABX  on Eliquis AC   Surgery and GI f/u noted  poss F/U as outpatient and colectomy discussion  dc planning  will need CXR repeat in 6 - 8 weeks to eval for resolution of Pleural Eff    I darryl  DVT p = ac = for thrombosis  emp ABX - ID eval - cx and biomarkers -   will consider - discuss with IR - Pleural eff drainage - likely reactive from the process of Abscess - Hepatic -   CLL management - Onc following  CT abd chest reviewed from outpatient  monitor VS and HD and Sat - keep sat > 88 pct  Surgery Eval noted  cvs rx regimen and BP control  Replete Lytes -

## 2022-02-02 NOTE — CHART NOTE - NSCHARTNOTEFT_GEN_A_CORE
spoke with pt about Pleural eff - options - Drainage vs Surveillance and monitoring as outpatient  pt wishes to have it monitored as outpatient and a decision for drainage to be decided then as an outpatient  will relay to Dr Tubbs from Onc - who follows with pt in the community

## 2022-02-02 NOTE — PROGRESS NOTE ADULT - ASSESSMENT
67 YO Male w/ PMHx of CLL (not undergoing treatment), HTN, BPH presents to ED after outpatient CT scan showed multiple hepatic abscess, sent by H/O Dr. Tubbs.  67 YO Male w/ PMHx of CLL (not undergoing treatment), HTN, BPH presents to ED after outpatient CT scan showed multiple hepatic abscess, sent by H/O Dr. Tubbs. Possible d/c today pending primary team.

## 2022-02-02 NOTE — DISCHARGE NOTE NURSING/CASE MANAGEMENT/SOCIAL WORK - NSDCFUADDAPPT_GEN_ALL_CORE_FT
- Please make an appointment for follow up with your primary doctor in 1-3 days  - Please make an appointment for follow up with Endocrine, Heme/onc, Pulm (information above)  - fu up your GASTROENTEROLOGISTS  in 2 wk , and repeat ct abd /pelvis in 3 to 4wk to check complete resolution of liver abscess .

## 2022-02-02 NOTE — PROGRESS NOTE ADULT - PROVIDER SPECIALTY LIST ADULT
Infectious Disease
Infectious Disease
Pulmonology
Surgery
Gastroenterology
Heme/Onc
Surgery
Surgery
Gastroenterology
Heme/Onc
Infectious Disease
Pulmonology
Pulmonology
Surgery
Surgery
Hospitalist
Heme/Onc
Hospitalist

## 2022-02-02 NOTE — PROGRESS NOTE ADULT - REASON FOR ADMISSION
hepatic abscess

## 2022-02-02 NOTE — PROGRESS NOTE ADULT - ASSESSMENT
Patient is a 66 year old male with PMH of CLL, HTN, BPH hx diverticulitis ( last flare 18 months ago ) admitted with   Strep bacteremia secondary to liver abscesses likely due to Acute diverticulitis   -- Strep intermedius sensitivities reviewed, pansensitive   +thrombosis of the right hepatic vein consistent with septic thrombophlebitis  Pleural effusion and atelectasis likely in setting of abscess - doubt pneumonia, Pulm following   Wbc downtrended - Pt has CLL thus not good marker for infectious process  Given location and size of liver abscess not amenable to IR drainage  1/31 CTAP shows mildly decreased multifocal liver abscesses  1/31 repeat blood cultures NGTD   s/p pip-tazo     Recommendations:   Continue Levofloxacin 750mg PO QD (Qtc ok) and Metronidazole 500mg PO Q8h   -- plan to treat for at least 4 weeks from negative blood cultures with follow up CTAP to reassess before discontinuing antibiotics    -- patient to follow up in ID office in 3 weeks  AC per primary team  Discharge planning per primary team      Roxanna Brand M.D.  [facility], Division of Infectious Diseases  [phone]  After 5pm on weekdays and all day on weekends - please call [phone]

## 2022-02-02 NOTE — PROGRESS NOTE ADULT - PROBLEM SELECTOR PROBLEM 1
Hepatic abscess

## 2022-02-03 ENCOUNTER — NON-APPOINTMENT (OUTPATIENT)
Age: 67
End: 2022-02-03

## 2022-02-03 PROBLEM — I10 ESSENTIAL (PRIMARY) HYPERTENSION: Chronic | Status: ACTIVE | Noted: 2022-01-28

## 2022-02-03 PROBLEM — C91.10 CHRONIC LYMPHOCYTIC LEUKEMIA OF B-CELL TYPE NOT HAVING ACHIEVED REMISSION: Chronic | Status: ACTIVE | Noted: 2022-01-28

## 2022-02-03 PROBLEM — K21.9 GASTRO-ESOPHAGEAL REFLUX DISEASE WITHOUT ESOPHAGITIS: Chronic | Status: ACTIVE | Noted: 2022-01-28

## 2022-02-03 PROBLEM — K57.92 DIVERTICULITIS OF INTESTINE, PART UNSPECIFIED, WITHOUT PERFORATION OR ABSCESS WITHOUT BLEEDING: Chronic | Status: ACTIVE | Noted: 2022-01-28

## 2022-02-03 PROBLEM — N40.0 BENIGN PROSTATIC HYPERPLASIA WITHOUT LOWER URINARY TRACT SYMPTOMS: Chronic | Status: ACTIVE | Noted: 2022-01-28

## 2022-02-05 LAB
CULTURE RESULTS: SIGNIFICANT CHANGE UP
CULTURE RESULTS: SIGNIFICANT CHANGE UP
SPECIMEN SOURCE: SIGNIFICANT CHANGE UP
SPECIMEN SOURCE: SIGNIFICANT CHANGE UP

## 2022-02-11 ENCOUNTER — APPOINTMENT (OUTPATIENT)
Dept: GASTROENTEROLOGY | Facility: CLINIC | Age: 67
End: 2022-02-11
Payer: COMMERCIAL

## 2022-02-11 VITALS
HEIGHT: 66 IN | WEIGHT: 160 LBS | DIASTOLIC BLOOD PRESSURE: 68 MMHG | SYSTOLIC BLOOD PRESSURE: 92 MMHG | TEMPERATURE: 97.3 F | BODY MASS INDEX: 25.71 KG/M2

## 2022-02-11 DIAGNOSIS — K75.0 ABSCESS OF LIVER: ICD-10-CM

## 2022-02-11 DIAGNOSIS — Z85.6 PERSONAL HISTORY OF LEUKEMIA: ICD-10-CM

## 2022-02-11 DIAGNOSIS — K57.32 DIVERTICULITIS OF LARGE INTESTINE W/OUT PERFORATION OR ABSCESS W/OUT BLEEDING: ICD-10-CM

## 2022-02-11 DIAGNOSIS — Z86.010 PERSONAL HISTORY OF COLONIC POLYPS: ICD-10-CM

## 2022-02-11 PROCEDURE — 99215 OFFICE O/P EST HI 40 MIN: CPT

## 2022-02-13 NOTE — ASSESSMENT
[FreeTextEntry1] : Patient status post hospitalization due to the presence of multiple hepatic abscesses and associated thrombosis of the right hepatic vein.  It appears that the initial etiology may have been diverticulitis although the patient never had symptoms of this.  He is currently on Levaquin and metronidazole and Eliquis.\par \par The patient will go for CT scan of the abdomen and pelvis in 3 weeks to assess response.\par \par The patient was advised that he must follow-up with the infectious disease doctor.  He is staying on the antibiotics at least until the CT scan and then will follow the advice of the infectious disease specialist.\par \par Patient is due for colonoscopy in October 2023.\par \par \par Plan from 11/4/2020 - Patient with an adenomatous polyp.  He has diverticulosis and a history of diverticulitis.\par \par We will repeat a colonoscopy in 3 years that is October 2023.\par \par Patient was counseled regarding a high-fiber diet.  He uses psyllium at night.  He was advised to continue this.\par \par \par Plan from 9/11/2020 - Patient recovered after a second episode of diverticulitis in 2 months.\par \par A colonoscopy will be scheduled with the end of October.  The risks, benefits, alternatives, and limitations of the procedure, including the possibility of missed lesions, were explained.\par \par Patient was advised to return to a high-fiber diet.\par \par \par Plan from 8/24/2020 - Patient status post an episode of diverticulitis in early July who now has recurrent abdominal pain with left lower quadrant tenderness consistent with recurrent diverticulitis.  He has a history of adenomatous polyps.\par \par Patient was given Augmentin 875 mg twice daily for 14 days.  He was also advised to stay on a low residue diet.\par \par Patient was advised to call if he has any worsening of his symptoms.  Otherwise he will return to see us in 3 weeks.\par \par The patient was advised that he will require a colonoscopy in about 6 weeks given the 2 episodes of diverticulitis in less than 2 months.

## 2022-02-13 NOTE — HISTORY OF PRESENT ILLNESS
[FreeTextEntry1] : The patient presents after a 5-day hospitalization at [facility].  He felt weak before Yasmeen and lost his sense of taste.  He did not have a Covid test at that time but did a home test 1 week later which was negative.  Patient improved but then worsened with chills and sweats.  He was noted to have a white blood cell count of 38 by his oncologist, which is higher than his usual baseline of 24-27 due to his CLL.  He had no abdominal pain but did have possible fever at that time.  He was sent for a CT scan which showed multiple abscesses in the right hepatic lobe with associated thrombosis of the right hepatic vein consistent with septic thrombophlebitis.  Additionally, diverticulitis was seen of the distal descending colon as well as a right pleural effusion.  He was hospitalized and blood cultures grew strep anginosus.  He was treated with IV antibiotics and was placed on Eliquis.  He is currently on Levaquin once a day and metronidazole 3 times a day.  He has lost 20 pounds.  He feels weak but states that he is gaining strength.  He does have a decreased appetite on the antibiotics.  He has never experienced abdominal pain during this time and denies nausea or vomiting.  He has 3 loose bowel movements a day.  He denies melena or bright red blood per rectum.\par \par \par Note from 11/4/2020 - We reviewed the patient's colonoscopy performed on October 19, 2020.  The examination was significant for removal of a tubular adenoma from the proximal transverse colon.  Additionally, the patient had moderate diverticulosis throughout the colon and also had terminal ileal diverticulosis.  Patient has small external hemorrhoids which are occasionally mildly symptomatic with spicy foods.  The patient uses psyllium at night.  He denies abdominal pain.  He has 2 solid bowel movements a day without melena or bright red blood per rectum.\par \par \par Note from 9/11/2020 - The patient's completed 14 days of Augmentin 875 mg twice daily for diverticulitis on Sunday.  The pain has resolved.  The patient had cramping and loose stools while on the antibiotic but the symptoms have also resolved.  The patient is now having 2 solid bowel movements a day without melena or bright red blood per rectum.  He denies abdominal pain.  The patient has not been admitted to the hospital in the past year and denies any cardiac issues.\par \par \par Note from 8/24/2020 - The patient was seen in coverage for Dr. Wang.\par \par Patient has a history of adenomatous colonic polyps and that has known left colon diverticulosis.  He went to the [facility] emergency room on July 5 with abdominal pain for 1 week and bloody diarrhea.  CT scan revealed acute moderate sigmoid diverticulitis.  The patient was treated with Cipro and metronidazole for 10 days with resolution of his symptoms.  He felt well until about 5 days ago when he again developed intermittent lower abdominal pain shooting to the right side.  He denies fever.  He had a small bowel movement on Saturday and Sunday with a single drop of blood on the toilet paper.  He denies nausea, vomiting, heartburn, dysphasia.  The patient has lost 10 pounds since July 5.

## 2022-02-13 NOTE — CONSULT LETTER
[FreeTextEntry1] : Dear Dr. Roshan Sandoval,\[facility] \[facility] I had the pleasure of seeing your patient SAMI MILES in the office today.  My office note is attached. PLEASE READ THE "ASSESSMENT" SECTION OF THE NOTE TO SEE MY IMPRESSION AND PLAN.\par \[facility] Thank you very much for allowing me to participate in the care of your patient.\[facility] \[facility] Sincerely,\[facility] \[facility] Lex Radford M.D., FAC, [facility] Director, Celiac Program at [facility]  of Medicine\[facility] brad Sabillon [facility] [employer] [facility] at [facility] Practice Director,[facility] - Gastroenterology/Internal Medicine at [place]\[facility] [address]\[place], [place] [address] Tel: [phone]\[facility] Email: [email or web address] \[facility] \[facility] The attached note has been created using a voice recognition system (Dragon).  There may be some misspellings and typos.  Please call my office if you have any issues or questions.

## 2022-02-13 NOTE — REASON FOR VISIT
[Post Hospitalization] : a post hospitalization visit [FreeTextEntry1] : Liver abscesses, diverticulitis, history of adenomatous colonic polyps

## 2022-03-02 ENCOUNTER — APPOINTMENT (OUTPATIENT)
Dept: CT IMAGING | Facility: CLINIC | Age: 67
End: 2022-03-02
Payer: COMMERCIAL

## 2022-03-02 PROCEDURE — 74177 CT ABD & PELVIS W/CONTRAST: CPT

## 2022-03-04 ENCOUNTER — NON-APPOINTMENT (OUTPATIENT)
Age: 67
End: 2022-03-04

## 2022-04-04 ENCOUNTER — APPOINTMENT (OUTPATIENT)
Dept: CT IMAGING | Facility: CLINIC | Age: 67
End: 2022-04-04
Payer: COMMERCIAL

## 2022-04-04 PROCEDURE — 74177 CT ABD & PELVIS W/CONTRAST: CPT

## 2022-07-20 ENCOUNTER — NON-APPOINTMENT (OUTPATIENT)
Age: 67
End: 2022-07-20

## 2022-07-25 ENCOUNTER — NON-APPOINTMENT (OUTPATIENT)
Age: 67
End: 2022-07-25

## 2022-10-05 ENCOUNTER — NON-APPOINTMENT (OUTPATIENT)
Age: 67
End: 2022-10-05

## 2023-02-26 NOTE — ED PROVIDER NOTE - DISPOSITION TYPE
Implemented All Universal Safety Interventions:  [place] to call system. Call bell, personal items and telephone within reach. Instruct patient to call for assistance. Room bathroom lighting operational. Non-slip footwear when patient is off stretcher. Physically safe environment: no spills, clutter or unnecessary equipment. Stretcher in lowest position, wheels locked, appropriate side rails in place. ADMIT

## 2023-12-20 ENCOUNTER — APPOINTMENT (OUTPATIENT)
Dept: CT IMAGING | Facility: CLINIC | Age: 68
End: 2023-12-20
Payer: COMMERCIAL

## 2023-12-20 PROCEDURE — 74170 CT ABD WO CNTRST FLWD CNTRST: CPT

## 2024-11-25 ENCOUNTER — APPOINTMENT (OUTPATIENT)
Dept: CT IMAGING | Facility: CLINIC | Age: 69
End: 2024-11-25

## 2024-12-02 ENCOUNTER — APPOINTMENT (OUTPATIENT)
Dept: CT IMAGING | Facility: CLINIC | Age: 69
End: 2024-12-02

## 2024-12-05 ENCOUNTER — APPOINTMENT (OUTPATIENT)
Dept: CT IMAGING | Facility: CLINIC | Age: 69
End: 2024-12-05
Payer: MEDICARE

## 2024-12-05 PROCEDURE — 74170 CT ABD WO CNTRST FLWD CNTRST: CPT | Mod: TC

## 2025-08-18 ENCOUNTER — NON-APPOINTMENT (OUTPATIENT)
Age: 70
End: 2025-08-18

## 2025-08-19 ENCOUNTER — NON-APPOINTMENT (OUTPATIENT)
Age: 70
End: 2025-08-19

## 2025-08-20 ENCOUNTER — APPOINTMENT (OUTPATIENT)
Dept: UROLOGY | Facility: CLINIC | Age: 70
End: 2025-08-20
Payer: COMMERCIAL

## 2025-08-20 DIAGNOSIS — N13.8 BENIGN PROSTATIC HYPERPLASIA WITH LOWER URINARY TRACT SYMPMS: ICD-10-CM

## 2025-08-20 DIAGNOSIS — Z78.9 OTHER SPECIFIED HEALTH STATUS: ICD-10-CM

## 2025-08-20 DIAGNOSIS — N40.1 BENIGN PROSTATIC HYPERPLASIA WITH LOWER URINARY TRACT SYMPMS: ICD-10-CM

## 2025-08-20 DIAGNOSIS — R35.1 NOCTURIA: ICD-10-CM

## 2025-08-20 DIAGNOSIS — N52.8 OTHER MALE ERECTILE DYSFUNCTION: ICD-10-CM

## 2025-08-20 DIAGNOSIS — R39.198 OTHER DIFFICULTIES WITH MICTURITION: ICD-10-CM

## 2025-08-20 PROCEDURE — 51798 US URINE CAPACITY MEASURE: CPT

## 2025-08-20 PROCEDURE — 99204 OFFICE O/P NEW MOD 45 MIN: CPT

## 2025-08-20 RX ORDER — TADALAFIL 5 MG/1
5 TABLET ORAL
Qty: 30 | Refills: 0 | Status: ACTIVE | COMMUNITY
Start: 2025-08-20 | End: 1900-01-01

## 2025-08-21 LAB
APPEARANCE: CLEAR
BACTERIA: NEGATIVE /HPF
BILIRUBIN URINE: NEGATIVE
BLOOD URINE: ABNORMAL
CAST: 0 /LPF
COLOR: YELLOW
EPITHELIAL CELLS: 0 /HPF
GLUCOSE QUALITATIVE U: NEGATIVE MG/DL
KETONES URINE: NEGATIVE MG/DL
LEUKOCYTE ESTERASE URINE: NEGATIVE
MICROSCOPIC-UA: NORMAL
NITRITE URINE: NEGATIVE
PH URINE: 7.5
PROTEIN URINE: NEGATIVE MG/DL
RED BLOOD CELLS URINE: 1 /HPF
SPECIFIC GRAVITY URINE: 1.01
UROBILINOGEN URINE: 0.2 MG/DL
WHITE BLOOD CELLS URINE: 0 /HPF

## 2025-08-22 LAB — BACTERIA UR CULT: NORMAL

## 2025-08-27 ENCOUNTER — APPOINTMENT (OUTPATIENT)
Dept: ULTRASOUND IMAGING | Facility: CLINIC | Age: 70
End: 2025-08-27
Payer: COMMERCIAL

## 2025-08-27 PROCEDURE — 76857 US EXAM PELVIC LIMITED: CPT

## 2025-08-28 ENCOUNTER — APPOINTMENT (OUTPATIENT)
Dept: GASTROENTEROLOGY | Facility: CLINIC | Age: 70
End: 2025-08-28
Payer: COMMERCIAL

## 2025-08-28 VITALS
OXYGEN SATURATION: 98 % | HEIGHT: 66 IN | TEMPERATURE: 97.5 F | BODY MASS INDEX: 28.12 KG/M2 | WEIGHT: 175 LBS | SYSTOLIC BLOOD PRESSURE: 118 MMHG | HEART RATE: 90 BPM | DIASTOLIC BLOOD PRESSURE: 74 MMHG

## 2025-08-28 DIAGNOSIS — Z86.0101 PERSONAL HISTORY OF ADENOMATOUS AND SERRATED COLON POLYPS: ICD-10-CM

## 2025-08-28 DIAGNOSIS — Z80.0 FAMILY HISTORY OF MALIGNANT NEOPLASM OF DIGESTIVE ORGANS: ICD-10-CM

## 2025-08-28 DIAGNOSIS — R12 HEARTBURN: ICD-10-CM

## 2025-08-28 DIAGNOSIS — Z78.9 OTHER SPECIFIED HEALTH STATUS: ICD-10-CM

## 2025-08-28 DIAGNOSIS — K75.0 ABSCESS OF LIVER: ICD-10-CM

## 2025-08-28 DIAGNOSIS — Z86.39 PERSONAL HISTORY OF OTHER ENDOCRINE, NUTRITIONAL AND METABOLIC DISEASE: ICD-10-CM

## 2025-08-28 DIAGNOSIS — Z87.19 PERSONAL HISTORY OF OTHER DISEASES OF THE DIGESTIVE SYSTEM: ICD-10-CM

## 2025-08-28 DIAGNOSIS — Z87.891 PERSONAL HISTORY OF NICOTINE DEPENDENCE: ICD-10-CM

## 2025-08-28 DIAGNOSIS — Z80.3 FAMILY HISTORY OF MALIGNANT NEOPLASM OF BREAST: ICD-10-CM

## 2025-08-28 DIAGNOSIS — Z95.5 PRESENCE OF CORONARY ANGIOPLASTY IMPLANT AND GRAFT: ICD-10-CM

## 2025-08-28 DIAGNOSIS — K21.9 GASTRO-ESOPHAGEAL REFLUX DISEASE W/OUT ESOPHAGITIS: ICD-10-CM

## 2025-08-28 DIAGNOSIS — I82.0 BUDD-CHIARI SYNDROME: ICD-10-CM

## 2025-08-28 PROCEDURE — 99204 OFFICE O/P NEW MOD 45 MIN: CPT

## 2025-08-28 RX ORDER — METOPROLOL SUCCINATE 25 MG/1
25 TABLET, EXTENDED RELEASE ORAL
Refills: 0 | Status: ACTIVE | COMMUNITY

## 2025-08-28 RX ORDER — FAMOTIDINE 20 MG/1
20 TABLET, FILM COATED ORAL
Refills: 0 | Status: ACTIVE | COMMUNITY

## 2025-08-28 RX ORDER — ATORVASTATIN CALCIUM 80 MG/1
80 TABLET, FILM COATED ORAL
Refills: 0 | Status: ACTIVE | COMMUNITY

## 2025-08-28 RX ORDER — PANTOPRAZOLE 40 MG/1
40 TABLET, DELAYED RELEASE ORAL
Qty: 90 | Refills: 3 | Status: ACTIVE | COMMUNITY
Start: 2025-08-28 | End: 1900-01-01

## 2025-08-28 RX ORDER — MAGNESIUM OXIDE/MAG AA CHELATE 300 MG
CAPSULE ORAL
Refills: 0 | Status: ACTIVE | COMMUNITY

## 2025-08-28 RX ORDER — ASPIRIN 81 MG
81 TABLET, DELAYED RELEASE (ENTERIC COATED) ORAL
Refills: 0 | Status: ACTIVE | COMMUNITY

## 2025-08-28 RX ORDER — LOSARTAN POTASSIUM 100 MG/1
100 TABLET, FILM COATED ORAL
Refills: 0 | Status: ACTIVE | COMMUNITY

## 2025-08-28 RX ORDER — SODIUM SULFATE, POTASSIUM SULFATE AND MAGNESIUM SULFATE 1.6; 3.13; 17.5 G/177ML; G/177ML; G/177ML
17.5-3.13-1.6 SOLUTION ORAL
Qty: 1 | Refills: 0 | Status: ACTIVE | COMMUNITY
Start: 2025-08-28 | End: 1900-01-01